# Patient Record
Sex: FEMALE | Race: WHITE | NOT HISPANIC OR LATINO | Employment: OTHER | URBAN - METROPOLITAN AREA
[De-identification: names, ages, dates, MRNs, and addresses within clinical notes are randomized per-mention and may not be internally consistent; named-entity substitution may affect disease eponyms.]

---

## 2018-03-12 ENCOUNTER — OFFICE VISIT (OUTPATIENT)
Dept: FAMILY MEDICINE CLINIC | Facility: CLINIC | Age: 73
End: 2018-03-12

## 2018-03-12 VITALS
DIASTOLIC BLOOD PRESSURE: 87 MMHG | HEART RATE: 66 BPM | OXYGEN SATURATION: 99 % | HEIGHT: 67 IN | BODY MASS INDEX: 27.7 KG/M2 | TEMPERATURE: 98 F | SYSTOLIC BLOOD PRESSURE: 162 MMHG | RESPIRATION RATE: 18 BRPM | WEIGHT: 176.5 LBS

## 2018-03-12 DIAGNOSIS — Z12.31 ENCOUNTER FOR SCREENING MAMMOGRAM FOR BREAST CANCER: ICD-10-CM

## 2018-03-12 DIAGNOSIS — I10 ESSENTIAL HYPERTENSION: Primary | ICD-10-CM

## 2018-03-12 DIAGNOSIS — E78.2 MIXED HYPERLIPIDEMIA: ICD-10-CM

## 2018-03-12 DIAGNOSIS — Z78.0 POSTMENOPAUSAL: ICD-10-CM

## 2018-03-12 DIAGNOSIS — R73.01 IMPAIRED FASTING GLUCOSE: ICD-10-CM

## 2018-03-12 DIAGNOSIS — E55.9 VITAMIN D DEFICIENCY: ICD-10-CM

## 2018-03-12 PROCEDURE — 99204 OFFICE O/P NEW MOD 45 MIN: CPT | Performed by: NURSE PRACTITIONER

## 2018-03-12 RX ORDER — CHLORAL HYDRATE 500 MG
CAPSULE ORAL
COMMUNITY
End: 2020-10-15

## 2018-03-12 RX ORDER — LISINOPRIL 10 MG/1
10 TABLET ORAL DAILY
Qty: 30 TABLET | Refills: 0 | Status: SHIPPED | OUTPATIENT
Start: 2018-03-12 | End: 2018-03-26 | Stop reason: SDUPTHER

## 2018-03-12 RX ORDER — PERPHENAZINE/AMITRIPTYLINE HCL 2 MG-25 MG
TABLET ORAL
COMMUNITY

## 2018-03-12 RX ORDER — ASCORBIC ACID 500 MG
500 TABLET ORAL DAILY
COMMUNITY
End: 2020-10-15

## 2018-03-12 NOTE — PROGRESS NOTES
Subjective   Bethany Weaver is a 72 y.o. female.     History of Present Illness   Bethany Weaver 72 y.o. female who presents today for a new patient appointment.    she has a history of There is no problem list on file for this patient.  .  she is here to establish care and is a new patient to me I reviewed the PFSH recorded today by my MA/LPN staff.   she   She has been feeling well until recent elevated blood pressure and fatigue.    Patient does not take medication for HTN. She states she was started on medication 10 years ago but did not tolerated it as it gave her back pain.  She does not remember the medication. States she has not had issues with her BP since until 2 weeks ago.  She noticed headaches and fatigue.  Her daughter has checked her BP a few times since and noted readings in 160s/90s.  Patient has also noticed increased fatigue and daytime sleepiness. Denies hx of sleep apnea or snoring.  Denies weakness or dizziness, states she just feels tired.     Has not had DEXA scan since 2013.  States her previous PCP wanted to start her on reclast but patient did not want to due to list of side effects.  She thinks she did not tolerate fosamax due to GI issues.  She is hesitant to take medication.  Is due for DEXA.     She has not had mammogram in a few years.  Has not had abnormal.    Denies hx of abnormal PAP and does not want to get anymore.     Her last lab set was last year.  Her fasting glucose was 99 but her A1c was 6.  Will repeat a1c with labs.     The following portions of the patient's history were reviewed and updated as appropriate: allergies, current medications, past family history, past medical history, past social history, past surgical history and problem list.    Review of Systems   Constitutional: Positive for fatigue. Negative for unexpected weight change.   Respiratory: Negative for shortness of breath.    Cardiovascular: Negative for chest pain and palpitations.   Neurological:  Positive for headaches. Negative for dizziness, weakness and light-headedness.   Psychiatric/Behavioral: Negative for behavioral problems.       Objective   Physical Exam   Constitutional: She is oriented to person, place, and time. She appears well-developed and well-nourished.   Neck: Carotid bruit is not present. No thyromegaly present.   Cardiovascular: Normal rate and regular rhythm.    Pulmonary/Chest: Effort normal and breath sounds normal.   Neurological: She is alert and oriented to person, place, and time.   Psychiatric: She has a normal mood and affect. Judgment normal.   Nursing note and vitals reviewed.      Assessment/Plan   Bethany was seen today for hypertension.    Diagnoses and all orders for this visit:    Essential hypertension  -     lisinopril (PRINIVIL,ZESTRIL) 10 MG tablet; Take 1 tablet by mouth Daily.    Vitamin D deficiency  -     Vitamin D 25 Hydroxy    Impaired fasting glucose  -     Hemoglobin A1c    Mixed hyperlipidemia  -     Comprehensive metabolic panel  -     Lipid panel  -     CBC and Differential  -     TSH    Postmenopausal  -     Cancel: DEXA Bone Density Axial  -     DEXA Bone Density Axial    Encounter for screening mammogram for breast cancer  -     Mammo Screening Bilateral With CAD          F/u in 4 weeks for BP recheck.

## 2018-03-13 LAB
25(OH)D3+25(OH)D2 SERPL-MCNC: 34.7 NG/ML (ref 30–100)
ALBUMIN SERPL-MCNC: 4.5 G/DL (ref 3.5–5.2)
ALBUMIN/GLOB SERPL: 1.8 G/DL
ALP SERPL-CCNC: 125 U/L (ref 39–117)
ALT SERPL-CCNC: 12 U/L (ref 1–33)
AST SERPL-CCNC: 16 U/L (ref 1–32)
BASOPHILS # BLD AUTO: 0.03 10*3/MM3 (ref 0–0.2)
BASOPHILS NFR BLD AUTO: 0.4 % (ref 0–1.5)
BILIRUB SERPL-MCNC: 0.3 MG/DL (ref 0.1–1.2)
BUN SERPL-MCNC: 15 MG/DL (ref 8–23)
BUN/CREAT SERPL: 19.2 (ref 7–25)
CALCIUM SERPL-MCNC: 10.3 MG/DL (ref 8.6–10.5)
CHLORIDE SERPL-SCNC: 102 MMOL/L (ref 98–107)
CHOLEST SERPL-MCNC: 244 MG/DL (ref 0–200)
CO2 SERPL-SCNC: 30.9 MMOL/L (ref 22–29)
CREAT SERPL-MCNC: 0.78 MG/DL (ref 0.57–1)
EOSINOPHIL # BLD AUTO: 0.18 10*3/MM3 (ref 0–0.7)
EOSINOPHIL NFR BLD AUTO: 2.4 % (ref 0.3–6.2)
ERYTHROCYTE [DISTWIDTH] IN BLOOD BY AUTOMATED COUNT: 13.4 % (ref 11.7–13)
GFR SERPLBLD CREATININE-BSD FMLA CKD-EPI: 73 ML/MIN/1.73
GFR SERPLBLD CREATININE-BSD FMLA CKD-EPI: 88 ML/MIN/1.73
GLOBULIN SER CALC-MCNC: 2.5 GM/DL
GLUCOSE SERPL-MCNC: 97 MG/DL (ref 65–99)
HBA1C MFR BLD: 5.5 % (ref 4.8–5.6)
HCT VFR BLD AUTO: 44.5 % (ref 35.6–45.5)
HDLC SERPL-MCNC: 98 MG/DL (ref 40–60)
HGB BLD-MCNC: 13.8 G/DL (ref 11.9–15.5)
IMM GRANULOCYTES # BLD: 0 10*3/MM3 (ref 0–0.03)
IMM GRANULOCYTES NFR BLD: 0 % (ref 0–0.5)
LDLC SERPL CALC-MCNC: 127 MG/DL (ref 0–100)
LYMPHOCYTES # BLD AUTO: 2.76 10*3/MM3 (ref 0.9–4.8)
LYMPHOCYTES NFR BLD AUTO: 37.1 % (ref 19.6–45.3)
MCH RBC QN AUTO: 29.6 PG (ref 26.9–32)
MCHC RBC AUTO-ENTMCNC: 31 G/DL (ref 32.4–36.3)
MCV RBC AUTO: 95.5 FL (ref 80.5–98.2)
MONOCYTES # BLD AUTO: 0.48 10*3/MM3 (ref 0.2–1.2)
MONOCYTES NFR BLD AUTO: 6.5 % (ref 5–12)
NEUTROPHILS # BLD AUTO: 3.98 10*3/MM3 (ref 1.9–8.1)
NEUTROPHILS NFR BLD AUTO: 53.6 % (ref 42.7–76)
PLATELET # BLD AUTO: 284 10*3/MM3 (ref 140–500)
POTASSIUM SERPL-SCNC: 4.9 MMOL/L (ref 3.5–5.2)
PROT SERPL-MCNC: 7 G/DL (ref 6–8.5)
RBC # BLD AUTO: 4.66 10*6/MM3 (ref 3.9–5.2)
SODIUM SERPL-SCNC: 142 MMOL/L (ref 136–145)
TRIGL SERPL-MCNC: 96 MG/DL (ref 0–150)
TSH SERPL DL<=0.005 MIU/L-ACNC: 4.02 MIU/ML (ref 0.27–4.2)
VLDLC SERPL CALC-MCNC: 19.2 MG/DL (ref 5–40)
WBC # BLD AUTO: 7.43 10*3/MM3 (ref 4.5–10.7)

## 2018-03-26 ENCOUNTER — OFFICE VISIT (OUTPATIENT)
Dept: FAMILY MEDICINE CLINIC | Facility: CLINIC | Age: 73
End: 2018-03-26

## 2018-03-26 VITALS
HEIGHT: 67 IN | HEART RATE: 68 BPM | TEMPERATURE: 97.8 F | SYSTOLIC BLOOD PRESSURE: 148 MMHG | RESPIRATION RATE: 18 BRPM | OXYGEN SATURATION: 96 % | WEIGHT: 176 LBS | BODY MASS INDEX: 27.62 KG/M2 | DIASTOLIC BLOOD PRESSURE: 73 MMHG

## 2018-03-26 DIAGNOSIS — M19.90 ARTHRITIS: ICD-10-CM

## 2018-03-26 DIAGNOSIS — I10 ESSENTIAL HYPERTENSION: Primary | ICD-10-CM

## 2018-03-26 PROCEDURE — 99213 OFFICE O/P EST LOW 20 MIN: CPT | Performed by: NURSE PRACTITIONER

## 2018-03-26 RX ORDER — LISINOPRIL 20 MG/1
20 TABLET ORAL DAILY
Qty: 30 TABLET | Refills: 0 | Status: SHIPPED | OUTPATIENT
Start: 2018-03-26 | End: 2018-04-19 | Stop reason: SDUPTHER

## 2018-03-26 NOTE — PROGRESS NOTES
Subjective   Bethany Weaver is a 72 y.o. female.     History of Present Illness   Patient presents to office to f/u on blood pressure. At last visit she was started on lisinopril 10 mg. Patient states she has been taking as prescribed. She reports feeling well and denies side effects.     Patient has arthritis in her right shoulder and both knees. She has had this for years.  She has a friend that does aquatic therapy at [facility] and patient would like an order so she can try this for treatment.   The following portions of the patient's history were reviewed and updated as appropriate: allergies, current medications, past family history, past medical history, past social history, past surgical history and problem list.    Review of Systems   Constitutional: Negative for unexpected weight change.   Respiratory: Negative for shortness of breath.    Cardiovascular: Negative for chest pain and palpitations.   Psychiatric/Behavioral: Negative for behavioral problems.       Objective   Physical Exam   Constitutional: She is oriented to person, place, and time. She appears well-developed and well-nourished.   Pulmonary/Chest: Effort normal.   Neurological: She is alert and oriented to person, place, and time.   Psychiatric: She has a normal mood and affect. Judgment normal.   Nursing note and vitals reviewed.      Assessment/Plan   Bethany was seen today for hypertension.    Diagnoses and all orders for this visit:    Essential hypertension  -     lisinopril (PRINIVIL,ZESTRIL) 20 MG tablet; Take 1 tablet by mouth Daily.    Arthritis  -     Cancel: [employer]  -     [employer]

## 2018-04-18 ENCOUNTER — HOSPITAL ENCOUNTER (OUTPATIENT)
Dept: MAMMOGRAPHY | Facility: HOSPITAL | Age: 73
Discharge: HOME | End: 2018-04-18

## 2018-04-18 ENCOUNTER — HOSPITAL ENCOUNTER (OUTPATIENT)
Dept: BONE DENSITY | Facility: HOSPITAL | Age: 73
Discharge: HOME | End: 2018-04-18
Admitting: NURSE PRACTITIONER

## 2018-04-18 PROCEDURE — 77067 SCR MAMMO BI INCL CAD: CPT

## 2018-04-18 PROCEDURE — 77080 DXA BONE DENSITY AXIAL: CPT

## 2018-04-19 ENCOUNTER — OFFICE VISIT (OUTPATIENT)
Dept: FAMILY MEDICINE CLINIC | Facility: CLINIC | Age: 73
End: 2018-04-19

## 2018-04-19 VITALS
DIASTOLIC BLOOD PRESSURE: 84 MMHG | SYSTOLIC BLOOD PRESSURE: 122 MMHG | WEIGHT: 175 LBS | RESPIRATION RATE: 18 BRPM | HEIGHT: 67 IN | OXYGEN SATURATION: 98 % | BODY MASS INDEX: 27.47 KG/M2 | TEMPERATURE: 98 F | HEART RATE: 68 BPM

## 2018-04-19 DIAGNOSIS — F41.9 ANXIETY: Primary | ICD-10-CM

## 2018-04-19 DIAGNOSIS — I10 ESSENTIAL HYPERTENSION: ICD-10-CM

## 2018-04-19 DIAGNOSIS — M85.89 OSTEOPENIA OF MULTIPLE SITES: ICD-10-CM

## 2018-04-19 PROCEDURE — 99214 OFFICE O/P EST MOD 30 MIN: CPT | Performed by: NURSE PRACTITIONER

## 2018-04-19 RX ORDER — ESCITALOPRAM OXALATE 10 MG/1
10 TABLET ORAL DAILY
Qty: 30 TABLET | Refills: 0 | Status: SHIPPED | OUTPATIENT
Start: 2018-04-19 | End: 2018-05-16 | Stop reason: SDUPTHER

## 2018-04-19 RX ORDER — LISINOPRIL 20 MG/1
20 TABLET ORAL DAILY
Qty: 30 TABLET | Refills: 5 | Status: SHIPPED | OUTPATIENT
Start: 2018-04-19 | End: 2018-05-16 | Stop reason: SDUPTHER

## 2018-04-19 NOTE — PROGRESS NOTES
Subjective   Bethany Weaver is a 72 y.o. female.     History of Present Illness   Patient presents to office to f/u on blood pressure. At last visit, her lisinopril was increased to 20 mg daily. Patient states she has been taking as prescribed. She reports feeling well and denies side effects.     Patient reports having a lot of anxiety related to taking care of family.  She would like to try something for anxiety.     Discussed negative mammogram and abnormal DEXA results.  Patient is taking caclium supplement and vitamin D but is not sure how much, nor how much she should be taking.   The following portions of the patient's history were reviewed and updated as appropriate: allergies, current medications, past family history, past medical history, past social history, past surgical history and problem list.    Review of Systems   Constitutional: Negative for unexpected weight change.   Respiratory: Negative for shortness of breath.    Cardiovascular: Negative for chest pain and palpitations.   Psychiatric/Behavioral: Negative for behavioral problems, self-injury, sleep disturbance and suicidal ideas. The patient is nervous/anxious.        Objective   Physical Exam   Constitutional: She is oriented to person, place, and time. She appears well-developed and well-nourished.   Cardiovascular: Normal rate and regular rhythm.    Pulmonary/Chest: Effort normal and breath sounds normal.   Neurological: She is alert and oriented to person, place, and time.   Psychiatric: Her speech is normal and behavior is normal. Judgment and thought content normal. Her mood appears anxious.   tearful   Nursing note and vitals reviewed.      Assessment/Plan   Bethany was seen today for hypertension.    Diagnoses and all orders for this visit:    Anxiety  -     escitalopram (LEXAPRO) 10 MG tablet; Take 1 tablet by mouth Daily.    Essential hypertension  -     lisinopril (PRINIVIL,ZESTRIL) 20 MG tablet; Take 1 tablet by mouth  Daily.    Osteopenia of multiple sites  -     Calcium 600-200 MG-UNIT per tablet; Take 2 tablets by mouth Daily.

## 2018-05-16 ENCOUNTER — OFFICE VISIT (OUTPATIENT)
Dept: FAMILY MEDICINE CLINIC | Facility: CLINIC | Age: 73
End: 2018-05-16

## 2018-05-16 VITALS
RESPIRATION RATE: 16 BRPM | DIASTOLIC BLOOD PRESSURE: 70 MMHG | HEART RATE: 62 BPM | SYSTOLIC BLOOD PRESSURE: 132 MMHG | HEIGHT: 67 IN | TEMPERATURE: 97.7 F | BODY MASS INDEX: 27.47 KG/M2 | OXYGEN SATURATION: 98 % | WEIGHT: 175 LBS

## 2018-05-16 DIAGNOSIS — I10 ESSENTIAL HYPERTENSION: ICD-10-CM

## 2018-05-16 DIAGNOSIS — F41.9 ANXIETY: ICD-10-CM

## 2018-05-16 PROCEDURE — 99213 OFFICE O/P EST LOW 20 MIN: CPT | Performed by: NURSE PRACTITIONER

## 2018-05-16 RX ORDER — ESCITALOPRAM OXALATE 10 MG/1
10 TABLET ORAL DAILY
Qty: 30 TABLET | Refills: 5 | Status: SHIPPED | OUTPATIENT
Start: 2018-05-16 | End: 2018-11-19 | Stop reason: SDUPTHER

## 2018-05-16 RX ORDER — LISINOPRIL 20 MG/1
20 TABLET ORAL DAILY
Qty: 30 TABLET | Refills: 5 | Status: SHIPPED | OUTPATIENT
Start: 2018-05-16 | End: 2018-11-19 | Stop reason: SDUPTHER

## 2018-05-16 NOTE — PROGRESS NOTES
Subjective   Bethany Weaver is a 72 y.o. female.     History of Present Illness   Bethany Weaver 72 y.o. female who presents today for routine follow up check and medication refills.  she has a history of   Patient Active Problem List   Diagnosis   • Osteopenia of multiple sites   .  Since the last visit, she has overall felt well.  She has Hypertenision and is well controlled on medication and anxiety is well controlled on lexapro.  she has been compliant with current medications have reviewed them.  The patient denies medication side effects.    Results for orders placed or performed in visit on 03/12/18   Comprehensive metabolic panel   Result Value Ref Range    Glucose 97 65 - 99 mg/dL    BUN 15 8 - 23 mg/dL    Creatinine 0.78 0.57 - 1.00 mg/dL    eGFR Non African Am 73 >60 mL/min/1.73    eGFR African Am 88 >60 mL/min/1.73    BUN/Creatinine Ratio 19.2 7.0 - 25.0    Sodium 142 136 - 145 mmol/L    Potassium 4.9 3.5 - 5.2 mmol/L    Chloride 102 98 - 107 mmol/L    Total CO2 30.9 (H) 22.0 - 29.0 mmol/L    Calcium 10.3 8.6 - 10.5 mg/dL    Total Protein 7.0 6.0 - 8.5 g/dL    Albumin 4.50 3.50 - 5.20 g/dL    Globulin 2.5 gm/dL    A/G Ratio 1.8 g/dL    Total Bilirubin 0.3 0.1 - 1.2 mg/dL    Alkaline Phosphatase 125 (H) 39 - 117 U/L    AST (SGOT) 16 1 - 32 U/L    ALT (SGPT) 12 1 - 33 U/L   Lipid panel   Result Value Ref Range    Total Cholesterol 244 (H) 0 - 200 mg/dL    Triglycerides 96 0 - 150 mg/dL    HDL Cholesterol 98 (H) 40 - 60 mg/dL    VLDL Cholesterol 19.2 5 - 40 mg/dL    LDL Cholesterol  127 (H) 0 - 100 mg/dL   TSH   Result Value Ref Range    TSH 4.020 0.270 - 4.200 mIU/mL   Hemoglobin A1c   Result Value Ref Range    Hemoglobin A1C 5.50 4.80 - 5.60 %   Vitamin D 25 Hydroxy   Result Value Ref Range    25 Hydroxy, Vitamin D 34.7 30.0 - 100.0 ng/ml   CBC and Differential   Result Value Ref Range    WBC 7.43 4.50 - 10.70 10*3/mm3    RBC 4.66 3.90 - 5.20 10*6/mm3    Hemoglobin 13.8 11.9 - 15.5 g/dL     Hematocrit 44.5 35.6 - 45.5 %    MCV 95.5 80.5 - 98.2 fL    MCH 29.6 26.9 - 32.0 pg    MCHC 31.0 (L) 32.4 - 36.3 g/dL    RDW 13.4 (H) 11.7 - 13.0 %    Platelets 284 140 - 500 10*3/mm3    Neutrophil Rel % 53.6 42.7 - 76.0 %    Lymphocyte Rel % 37.1 19.6 - 45.3 %    Monocyte Rel % 6.5 5.0 - 12.0 %    Eosinophil Rel % 2.4 0.3 - 6.2 %    Basophil Rel % 0.4 0.0 - 1.5 %    Neutrophils Absolute 3.98 1.90 - 8.10 10*3/mm3    Lymphocytes Absolute 2.76 0.90 - 4.80 10*3/mm3    Monocytes Absolute 0.48 0.20 - 1.20 10*3/mm3    Eosinophils Absolute 0.18 0.00 - 0.70 10*3/mm3    Basophils Absolute 0.03 0.00 - 0.20 10*3/mm3    Immature Granulocyte Rel % 0.0 0.0 - 0.5 %    Immature Grans Absolute 0.00 0.00 - 0.03 10*3/mm3       The following portions of the patient's history were reviewed and updated as appropriate: allergies, current medications, past family history, past medical history, past social history, past surgical history and problem list.    Review of Systems   Constitutional: Negative for unexpected weight change.   Respiratory: Negative for shortness of breath.    Cardiovascular: Negative for chest pain and palpitations.   Psychiatric/Behavioral: Negative for behavioral problems, dysphoric mood, self-injury, sleep disturbance and suicidal ideas. The patient is not nervous/anxious.        Objective   Physical Exam   Constitutional: She is oriented to person, place, and time. She appears well-developed and well-nourished.   Cardiovascular: Normal rate and regular rhythm.    Pulmonary/Chest: Effort normal and breath sounds normal.   Neurological: She is alert and oriented to person, place, and time.   Psychiatric: She has a normal mood and affect. Her behavior is normal. Judgment and thought content normal.   Nursing note and vitals reviewed.      Assessment/Plan   Bethany was seen today for anxiety and hypertension.    Diagnoses and all orders for this visit:    Anxiety  -     escitalopram (LEXAPRO) 10 MG tablet; Take 1  tablet by mouth Daily.    Essential hypertension  -     lisinopril (PRINIVIL,ZESTRIL) 20 MG tablet; Take 1 tablet by mouth Daily.

## 2018-11-19 ENCOUNTER — OFFICE VISIT (OUTPATIENT)
Dept: FAMILY MEDICINE CLINIC | Facility: CLINIC | Age: 73
End: 2018-11-19

## 2018-11-19 VITALS
TEMPERATURE: 97.7 F | WEIGHT: 177 LBS | HEIGHT: 67 IN | OXYGEN SATURATION: 94 % | SYSTOLIC BLOOD PRESSURE: 132 MMHG | DIASTOLIC BLOOD PRESSURE: 76 MMHG | BODY MASS INDEX: 27.78 KG/M2 | HEART RATE: 66 BPM

## 2018-11-19 DIAGNOSIS — I10 ESSENTIAL HYPERTENSION: ICD-10-CM

## 2018-11-19 DIAGNOSIS — F41.9 ANXIETY: ICD-10-CM

## 2018-11-19 DIAGNOSIS — M85.89 OSTEOPENIA OF MULTIPLE SITES: ICD-10-CM

## 2018-11-19 PROCEDURE — 96160 PT-FOCUSED HLTH RISK ASSMT: CPT | Performed by: NURSE PRACTITIONER

## 2018-11-19 PROCEDURE — 99213 OFFICE O/P EST LOW 20 MIN: CPT | Performed by: NURSE PRACTITIONER

## 2018-11-19 PROCEDURE — G0439 PPPS, SUBSEQ VISIT: HCPCS | Performed by: NURSE PRACTITIONER

## 2018-11-19 RX ORDER — LISINOPRIL 20 MG/1
20 TABLET ORAL DAILY
Qty: 30 TABLET | Refills: 5 | Status: SHIPPED | OUTPATIENT
Start: 2018-11-19 | End: 2019-11-21 | Stop reason: SDUPTHER

## 2018-11-19 RX ORDER — ESCITALOPRAM OXALATE 10 MG/1
10 TABLET ORAL DAILY
Qty: 30 TABLET | Refills: 5 | Status: SHIPPED | OUTPATIENT
Start: 2018-11-19 | End: 2020-06-03 | Stop reason: SDUPTHER

## 2018-11-19 NOTE — PATIENT INSTRUCTIONS
Medicare Wellness  Personal Prevention Plan of Service     Date of Office Visit:  2018  Encounter Provider:  SONNY Damico  Place of Service:  [facility]  Patient Name: Bethany Weaver  :  1945    As part of the Medicare Wellness portion of your visit today, we are providing you with this personalized preventive plan of services (PPPS). This plan is based upon recommendations of the [place] Preventive Services Task Force (USPSTF) and the Advisory Committee on Immunization Practices (ACIP).    This lists the preventive care services that should be considered, and provides dates of when you are due. Items listed as completed are up-to-date and do not require any further intervention.    Health Maintenance   Topic Date Due   • ZOSTER VACCINE (2 of 2) 2016   • MEDICARE ANNUAL WELLNESS  2018 (Originally 3/12/2018)   • PNEUMOCOCCAL VACCINES (65+ LOW/MEDIUM RISK) (1 of 2 - PCV13) 2018 (Originally 2012)   • TDAP/TD VACCINES (1 - Tdap) 2018 (Originally 1966)   • LIPID PANEL  2019   • MAMMOGRAM  2020   • COLONOSCOPY  2026   • INFLUENZA VACCINE  Completed   • HEPATITIS C SCREENING  Discontinued       No orders of the defined types were placed in this encounter.      No Follow-up on file.

## 2018-11-19 NOTE — PROGRESS NOTES
Subjective   Bethany Weaver is a 72 y.o. female.     History of Present Illness   Bethany Weaver 72 y.o. female who presents today for routine follow up check and medication refills.  she has a history of   Patient Active Problem List   Diagnosis   • Osteopenia of multiple sites   .  Since the last visit, she has overall felt well.  She has Hypertenision and is well controlled on medication and anxiety which is well controlled on current regimen.  she has been compliant with current medications have reviewed them.  The patient denies medication side effects.    Results for orders placed or performed in visit on 03/12/18   Comprehensive metabolic panel   Result Value Ref Range    Glucose 97 65 - 99 mg/dL    BUN 15 8 - 23 mg/dL    Creatinine 0.78 0.57 - 1.00 mg/dL    eGFR Non African Am 73 >60 mL/min/1.73    eGFR African Am 88 >60 mL/min/1.73    BUN/Creatinine Ratio 19.2 7.0 - 25.0    Sodium 142 136 - 145 mmol/L    Potassium 4.9 3.5 - 5.2 mmol/L    Chloride 102 98 - 107 mmol/L    Total CO2 30.9 (H) 22.0 - 29.0 mmol/L    Calcium 10.3 8.6 - 10.5 mg/dL    Total Protein 7.0 6.0 - 8.5 g/dL    Albumin 4.50 3.50 - 5.20 g/dL    Globulin 2.5 gm/dL    A/G Ratio 1.8 g/dL    Total Bilirubin 0.3 0.1 - 1.2 mg/dL    Alkaline Phosphatase 125 (H) 39 - 117 U/L    AST (SGOT) 16 1 - 32 U/L    ALT (SGPT) 12 1 - 33 U/L   Lipid panel   Result Value Ref Range    Total Cholesterol 244 (H) 0 - 200 mg/dL    Triglycerides 96 0 - 150 mg/dL    HDL Cholesterol 98 (H) 40 - 60 mg/dL    VLDL Cholesterol 19.2 5 - 40 mg/dL    LDL Cholesterol  127 (H) 0 - 100 mg/dL   TSH   Result Value Ref Range    TSH 4.020 0.270 - 4.200 mIU/mL   Hemoglobin A1c   Result Value Ref Range    Hemoglobin A1C 5.50 4.80 - 5.60 %   Vitamin D 25 Hydroxy   Result Value Ref Range    25 Hydroxy, Vitamin D 34.7 30.0 - 100.0 ng/ml   CBC and Differential   Result Value Ref Range    WBC 7.43 4.50 - 10.70 10*3/mm3    RBC 4.66 3.90 - 5.20 10*6/mm3    Hemoglobin 13.8 11.9 - 15.5  g/dL    Hematocrit 44.5 35.6 - 45.5 %    MCV 95.5 80.5 - 98.2 fL    MCH 29.6 26.9 - 32.0 pg    MCHC 31.0 (L) 32.4 - 36.3 g/dL    RDW 13.4 (H) 11.7 - 13.0 %    Platelets 284 140 - 500 10*3/mm3    Neutrophil Rel % 53.6 42.7 - 76.0 %    Lymphocyte Rel % 37.1 19.6 - 45.3 %    Monocyte Rel % 6.5 5.0 - 12.0 %    Eosinophil Rel % 2.4 0.3 - 6.2 %    Basophil Rel % 0.4 0.0 - 1.5 %    Neutrophils Absolute 3.98 1.90 - 8.10 10*3/mm3    Lymphocytes Absolute 2.76 0.90 - 4.80 10*3/mm3    Monocytes Absolute 0.48 0.20 - 1.20 10*3/mm3    Eosinophils Absolute 0.18 0.00 - 0.70 10*3/mm3    Basophils Absolute 0.03 0.00 - 0.20 10*3/mm3    Immature Granulocyte Rel % 0.0 0.0 - 0.5 %    Immature Grans Absolute 0.00 0.00 - 0.03 10*3/mm3       The following portions of the patient's history were reviewed and updated as appropriate: allergies, current medications, past family history, past medical history, past social history, past surgical history and problem list.    Review of Systems   Constitutional: Negative for unexpected weight change.   Respiratory: Negative for shortness of breath.    Cardiovascular: Negative for chest pain and palpitations.   Endocrine: Negative for cold intolerance, heat intolerance, polydipsia, polyphagia and polyuria.   Psychiatric/Behavioral: Negative for behavioral problems, dysphoric mood, self-injury, sleep disturbance and suicidal ideas. The patient is not nervous/anxious.        Objective   Physical Exam   Constitutional: She is oriented to person, place, and time. She appears well-developed and well-nourished.   Neck: Carotid bruit is not present.   Cardiovascular: Normal rate and regular rhythm.   Pulmonary/Chest: Effort normal and breath sounds normal.   Neurological: She is alert and oriented to person, place, and time.   Psychiatric: She has a normal mood and affect. Judgment normal.   Nursing note and vitals reviewed.      Assessment/Plan   Bethany was seen today for hypertension.    Diagnoses and all  orders for this visit:    Essential hypertension  -     lisinopril (PRINIVIL,ZESTRIL) 20 MG tablet; Take 1 tablet by mouth Daily.    Anxiety  -     escitalopram (LEXAPRO) 10 MG tablet; Take 1 tablet by mouth Daily.    Osteopenia of multiple sites    Other orders  -     Cancel: Calcium 600-200 MG-UNIT per tablet; Take 2 tablets by mouth Daily.

## 2018-11-19 NOTE — PROGRESS NOTES
QUICK REFERENCE INFORMATION:  The ABCs of the Annual Wellness Visit    Subsequent Medicare Wellness Visit    HEALTH RISK ASSESSMENT    1945    Recent Hospitalizations:  No hospitalization(s) within the last year..        Current Medical Providers:  Patient Care Team:  Shameka Rodriguez APRN as PCP - General (Family Medicine)        Smoking Status:  Social History     Tobacco Use   Smoking Status Never Smoker   Smokeless Tobacco Never Used       Alcohol Consumption:  Social History     Substance and Sexual Activity   Alcohol Use No       Depression Screen:   PHQ-2/PHQ-9 Depression Screening 11/19/2018   Little interest or pleasure in doing things 0   Feeling down, depressed, or hopeless 0   Total Score 0       Health Habits and Functional and Cognitive Screening:  Functional & Cognitive Status 11/19/2018   Do you have difficulty preparing food and eating? No   Do you have difficulty bathing yourself, getting dressed or grooming yourself? No   Do you have difficulty using the toilet? No   Do you have difficulty moving around from place to place? No   Do you have trouble with steps or getting out of a bed or a chair? No   In the past year have you fallen or experienced a near fall? No   Current Diet Well Balanced Diet   Dental Exam Up to date   Eye Exam Up to date   Exercise (times per week) 0 times per week   Current Exercise Activities Include Cardiovasular Workout on Exercise Equipment   Do you need help using the phone?  No   Are you deaf or do you have serious difficulty hearing?  No   Do you need help with transportation? No   Do you need help shopping? No   Do you need help preparing meals?  No   Do you need help with housework?  No   Do you need help with laundry? No   Do you need help taking your medications? No   Do you need help managing money? No   Do you ever drive or ride in a car without wearing a seat belt? No   Have you felt unusual stress, anger or loneliness in the last month? No   Who  do you live with? Child   If you need help, do you have trouble finding someone available to you? Yes   Have you been bothered in the last four weeks by sexual problems? No   Do you have difficulty concentrating, remembering or making decisions? No           Does the patient have evidence of cognitive impairment? No    Aspirin use counseling: Start ASA 81 mg daily       Recent Lab Results:  CMP:  Lab Results   Component Value Date    GLU 97 03/12/2018    BUN 15 03/12/2018    CREATININE 0.78 03/12/2018    EGFRIFNONA 73 03/12/2018    EGFRIFAFRI 88 03/12/2018    BCR 19.2 03/12/2018     03/12/2018    K 4.9 03/12/2018    CO2 30.9 (H) 03/12/2018    CALCIUM 10.3 03/12/2018    PROTENTOTREF 7.0 03/12/2018    ALBUMIN 4.50 03/12/2018    LABGLOBREF 2.5 03/12/2018    LABIL2 1.8 03/12/2018    BILITOT 0.3 03/12/2018    ALKPHOS 125 (H) 03/12/2018    AST 16 03/12/2018    ALT 12 03/12/2018     Lipid Panel:  Lab Results   Component Value Date    TRIG 96 03/12/2018    HDL 98 (H) 03/12/2018    VLDL 19.2 03/12/2018     HbA1c:  Lab Results   Component Value Date    HGBA1C 5.50 03/12/2018       Visual Acuity:  No exam data present    Age-appropriate Screening Schedule:  Refer to the list below for future screening recommendations based on patient's age, sex and/or medical conditions. Orders for these recommended tests are listed in the plan section. The patient has been provided with a written plan.    Health Maintenance   Topic Date Due   • ZOSTER VACCINE (2 of 2) 02/26/2016   • PNEUMOCOCCAL VACCINES (65+ LOW/MEDIUM RISK) (1 of 2 - PCV13) 12/31/2018 (Originally 12/21/2012)   • TDAP/TD VACCINES (1 - Tdap) 12/31/2018 (Originally 12/21/1966)   • LIPID PANEL  03/12/2019   • MAMMOGRAM  04/18/2020   • COLONOSCOPY  02/23/2026   • INFLUENZA VACCINE  Completed        Subjective   History of Present Illness    Bethany Weaver is a 72 y.o. female who presents for an Subsequent Wellness Visit.    The following portions of the patient's  "history were reviewed and updated as appropriate: allergies, current medications, past family history, past medical history, past social history, past surgical history and problem list.    Outpatient Medications Prior to Visit   Medication Sig Dispense Refill   • BIOTIN 5000 PO Take  by mouth.     • Calcium 600-200 MG-UNIT per tablet Take 2 tablets by mouth Daily. 60 tablet 11   • Cholecalciferol (VITAMIN D3) 5000 units capsule capsule Take 1,000 Units by mouth Daily.     • Cyanocobalamin (B-12) 3000 MCG sublingual tablet Place  under the tongue.     • Loratadine (CLARITIN PO) Take  by mouth.     • Misc Natural Products (OSTEO BI-FLEX JOINT SHIELD PO) Take  by mouth.     • Omega-3 Fatty Acids (FISH OIL) 1000 MG capsule capsule Take  by mouth Daily With Breakfast.     • vitamin C (ASCORBIC ACID) 500 MG tablet Take 500 mg by mouth Daily.     • escitalopram (LEXAPRO) 10 MG tablet Take 1 tablet by mouth Daily. 30 tablet 5   • lisinopril (PRINIVIL,ZESTRIL) 20 MG tablet Take 1 tablet by mouth Daily. 30 tablet 5     No facility-administered medications prior to visit.        Patient Active Problem List   Diagnosis   • Osteopenia of multiple sites       Advance Care Planning:  has an advance directive - a copy HAS NOT been provided    Identification of Risk Factors:  Risk factors include: weight  and cardiovascular risk.    Review of Systems    Compared to one year ago, the patient feels her physical health is worse.  Reports she feels more fatigued than last year.    Compared to one year ago, the patient feels her mental health is the same.    Objective     Physical Exam    Vitals:    11/19/18 1027 11/19/18 1112   BP: 144/76 132/76   BP Location: Left arm    Patient Position: Sitting    Cuff Size: Adult    Pulse: 66    Temp: 97.7 °F (36.5 °C)    TempSrc: Oral    SpO2: 94%    Weight: 80.3 kg (177 lb)    Height: 170.2 cm (67\")        Patient's Body mass index is 27.72 kg/m². BMI is above normal parameters. Recommendations " include: exercise counseling.      Assessment/Plan   Patient Self-Management and Personalized Health Advice  The patient has been provided with information about: diet and exercise and preventive services including:   · Exercise counseling provided.    Visit Diagnoses:    ICD-10-CM ICD-9-CM   1. Essential hypertension I10 401.9   2. Anxiety F41.9 300.00   3. Osteopenia of multiple sites M85.89 733.90       No orders of the defined types were placed in this encounter.      Outpatient Encounter Medications as of 11/19/2018   Medication Sig Dispense Refill   • BIOTIN 5000 PO Take  by mouth.     • Calcium 600-200 MG-UNIT per tablet Take 2 tablets by mouth Daily. 60 tablet 11   • Cholecalciferol (VITAMIN D3) 5000 units capsule capsule Take 1,000 Units by mouth Daily.     • Cyanocobalamin (B-12) 3000 MCG sublingual tablet Place  under the tongue.     • escitalopram (LEXAPRO) 10 MG tablet Take 1 tablet by mouth Daily. 30 tablet 5   • lisinopril (PRINIVIL,ZESTRIL) 20 MG tablet Take 1 tablet by mouth Daily. 30 tablet 5   • Loratadine (CLARITIN PO) Take  by mouth.     • Misc Natural Products (OSTEO BI-FLEX JOINT SHIELD PO) Take  by mouth.     • Omega-3 Fatty Acids (FISH OIL) 1000 MG capsule capsule Take  by mouth Daily With Breakfast.     • vitamin C (ASCORBIC ACID) 500 MG tablet Take 500 mg by mouth Daily.     • [DISCONTINUED] escitalopram (LEXAPRO) 10 MG tablet Take 1 tablet by mouth Daily. 30 tablet 5   • [DISCONTINUED] lisinopril (PRINIVIL,ZESTRIL) 20 MG tablet Take 1 tablet by mouth Daily. 30 tablet 5     No facility-administered encounter medications on file as of 11/19/2018.        Reviewed use of high risk medication in the elderly: yes  Reviewed for potential of harmful drug interactions in the elderly: yes    Follow Up:  No Follow-up on file.     An After Visit Summary and PPPS with all of these plans were given to the patient.

## 2019-11-21 DIAGNOSIS — I10 ESSENTIAL HYPERTENSION: ICD-10-CM

## 2019-11-21 RX ORDER — LISINOPRIL 20 MG/1
20 TABLET ORAL DAILY
Qty: 30 TABLET | Refills: 0 | Status: SHIPPED | OUTPATIENT
Start: 2019-11-21 | End: 2020-01-24

## 2020-01-18 DIAGNOSIS — I10 ESSENTIAL HYPERTENSION: ICD-10-CM

## 2020-01-24 RX ORDER — LISINOPRIL 20 MG/1
TABLET ORAL
Qty: 30 TABLET | Refills: 0 | Status: SHIPPED | OUTPATIENT
Start: 2020-01-24 | End: 2020-03-18

## 2020-02-27 DIAGNOSIS — I10 ESSENTIAL HYPERTENSION: ICD-10-CM

## 2020-02-27 RX ORDER — LISINOPRIL 20 MG/1
TABLET ORAL
Qty: 30 TABLET | Refills: 0 | OUTPATIENT
Start: 2020-02-27

## 2020-03-18 DIAGNOSIS — I10 ESSENTIAL HYPERTENSION: ICD-10-CM

## 2020-03-18 RX ORDER — LISINOPRIL 20 MG/1
TABLET ORAL
Qty: 30 TABLET | Refills: 0 | Status: SHIPPED | OUTPATIENT
Start: 2020-03-18 | End: 2020-06-03

## 2020-04-23 DIAGNOSIS — I10 ESSENTIAL HYPERTENSION: ICD-10-CM

## 2020-04-23 RX ORDER — LISINOPRIL 20 MG/1
TABLET ORAL
Qty: 30 TABLET | Refills: 0 | OUTPATIENT
Start: 2020-04-23

## 2020-05-18 DIAGNOSIS — I10 ESSENTIAL HYPERTENSION: ICD-10-CM

## 2020-05-18 RX ORDER — LISINOPRIL 20 MG/1
TABLET ORAL
Qty: 30 TABLET | Refills: 0 | OUTPATIENT
Start: 2020-05-18

## 2020-06-03 ENCOUNTER — OFFICE VISIT (OUTPATIENT)
Dept: FAMILY MEDICINE CLINIC | Facility: CLINIC | Age: 75
End: 2020-06-03

## 2020-06-03 VITALS
WEIGHT: 175 LBS | HEIGHT: 67 IN | HEART RATE: 64 BPM | BODY MASS INDEX: 27.47 KG/M2 | SYSTOLIC BLOOD PRESSURE: 130 MMHG | TEMPERATURE: 97.6 F | RESPIRATION RATE: 16 BRPM | DIASTOLIC BLOOD PRESSURE: 70 MMHG | OXYGEN SATURATION: 96 %

## 2020-06-03 DIAGNOSIS — F41.9 ANXIETY: ICD-10-CM

## 2020-06-03 DIAGNOSIS — I10 ESSENTIAL HYPERTENSION: Primary | ICD-10-CM

## 2020-06-03 PROCEDURE — 99214 OFFICE O/P EST MOD 30 MIN: CPT | Performed by: NURSE PRACTITIONER

## 2020-06-03 RX ORDER — ESCITALOPRAM OXALATE 10 MG/1
10 TABLET ORAL DAILY
Qty: 30 TABLET | Refills: 5 | Status: SHIPPED | OUTPATIENT
Start: 2020-06-03 | End: 2021-06-04

## 2020-06-03 NOTE — PROGRESS NOTES
"Subjective   Bethany Weaver is a 74 y.o. female.     History of Present Illness    Since the last visit, she has overall felt well.  She has HTN and has been out of medication for several months.  Her BP is doing well off medication and she states she has similar readings at home.  She has also been out of her lexparo which she felt controlled her anxiety and would like to restart.  she has been compliant with current medications have reviewed them.  The patient denies medication side effects.  Will refill medications. /70   Pulse 64   Temp 97.6 °F (36.4 °C)   Resp 16   Ht 170.2 cm (67\")   Wt 79.4 kg (175 lb)   SpO2 96%   BMI 27.41 kg/m²     Results for orders placed or performed in visit on 03/12/18   Comprehensive metabolic panel   Result Value Ref Range    Glucose 97 65 - 99 mg/dL    BUN 15 8 - 23 mg/dL    Creatinine 0.78 0.57 - 1.00 mg/dL    eGFR Non African Am 73 >60 mL/min/1.73    eGFR African Am 88 >60 mL/min/1.73    BUN/Creatinine Ratio 19.2 7.0 - 25.0    Sodium 142 136 - 145 mmol/L    Potassium 4.9 3.5 - 5.2 mmol/L    Chloride 102 98 - 107 mmol/L    Total CO2 30.9 (H) 22.0 - 29.0 mmol/L    Calcium 10.3 8.6 - 10.5 mg/dL    Total Protein 7.0 6.0 - 8.5 g/dL    Albumin 4.50 3.50 - 5.20 g/dL    Globulin 2.5 gm/dL    A/G Ratio 1.8 g/dL    Total Bilirubin 0.3 0.1 - 1.2 mg/dL    Alkaline Phosphatase 125 (H) 39 - 117 U/L    AST (SGOT) 16 1 - 32 U/L    ALT (SGPT) 12 1 - 33 U/L   Lipid panel   Result Value Ref Range    Total Cholesterol 244 (H) 0 - 200 mg/dL    Triglycerides 96 0 - 150 mg/dL    HDL Cholesterol 98 (H) 40 - 60 mg/dL    VLDL Cholesterol 19.2 5 - 40 mg/dL    LDL Cholesterol  127 (H) 0 - 100 mg/dL   TSH   Result Value Ref Range    TSH 4.020 0.270 - 4.200 mIU/mL   Hemoglobin A1c   Result Value Ref Range    Hemoglobin A1C 5.50 4.80 - 5.60 %   Vitamin D 25 Hydroxy   Result Value Ref Range    25 Hydroxy, Vitamin D 34.7 30.0 - 100.0 ng/ml   CBC and Differential   Result Value Ref Range    WBC " 7.43 4.50 - 10.70 10*3/mm3    RBC 4.66 3.90 - 5.20 10*6/mm3    Hemoglobin 13.8 11.9 - 15.5 g/dL    Hematocrit 44.5 35.6 - 45.5 %    MCV 95.5 80.5 - 98.2 fL    MCH 29.6 26.9 - 32.0 pg    MCHC 31.0 (L) 32.4 - 36.3 g/dL    RDW 13.4 (H) 11.7 - 13.0 %    Platelets 284 140 - 500 10*3/mm3    Neutrophil Rel % 53.6 42.7 - 76.0 %    Lymphocyte Rel % 37.1 19.6 - 45.3 %    Monocyte Rel % 6.5 5.0 - 12.0 %    Eosinophil Rel % 2.4 0.3 - 6.2 %    Basophil Rel % 0.4 0.0 - 1.5 %    Neutrophils Absolute 3.98 1.90 - 8.10 10*3/mm3    Lymphocytes Absolute 2.76 0.90 - 4.80 10*3/mm3    Monocytes Absolute 0.48 0.20 - 1.20 10*3/mm3    Eosinophils Absolute 0.18 0.00 - 0.70 10*3/mm3    Basophils Absolute 0.03 0.00 - 0.20 10*3/mm3    Immature Granulocyte Rel % 0.0 0.0 - 0.5 %    Immature Grans Absolute 0.00 0.00 - 0.03 10*3/mm3         The following portions of the patient's history were reviewed and updated as appropriate: allergies, current medications, past family history, past medical history, past social history, past surgical history and problem list.    Review of Systems   Constitutional: Negative for unexpected weight change.   Respiratory: Negative for shortness of breath.    Cardiovascular: Negative for chest pain and palpitations.   Psychiatric/Behavioral: Negative for behavioral problems, self-injury and suicidal ideas. The patient is nervous/anxious.        Objective   Physical Exam   Constitutional: She is oriented to person, place, and time. She appears well-developed and well-nourished.   Neck: Carotid bruit is not present.   Cardiovascular: Normal rate and regular rhythm.   Pulmonary/Chest: Effort normal and breath sounds normal.   Neurological: She is alert and oriented to person, place, and time.   Psychiatric: She has a normal mood and affect. Her behavior is normal. Judgment and thought content normal.   Nursing note and vitals reviewed.      Assessment/Plan   Bethany was seen today for hypertension, anxiety and  depression.    Diagnoses and all orders for this visit:    Essential hypertension  -     Comprehensive metabolic panel  -     Lipid panel  -     CBC and Differential  -     TSH    Anxiety  -     escitalopram (Lexapro) 10 MG tablet; Take 1 tablet by mouth Daily.        D/c lisinopril and f/u in 1-2 months for recheck.  Continue to monitor at home.

## 2020-06-09 LAB
ALBUMIN SERPL-MCNC: 4.5 G/DL (ref 3.5–5.2)
ALBUMIN/GLOB SERPL: 1.9 G/DL
ALP SERPL-CCNC: 131 U/L (ref 39–117)
ALT SERPL-CCNC: 24 U/L (ref 1–33)
AST SERPL-CCNC: 28 U/L (ref 1–32)
BASOPHILS # BLD AUTO: 0.05 10*3/MM3 (ref 0–0.2)
BASOPHILS NFR BLD AUTO: 0.7 % (ref 0–1.5)
BILIRUB SERPL-MCNC: 0.3 MG/DL (ref 0.2–1.2)
BUN SERPL-MCNC: 17 MG/DL (ref 8–23)
BUN/CREAT SERPL: 22.4 (ref 7–25)
CALCIUM SERPL-MCNC: 10.7 MG/DL (ref 8.6–10.5)
CHLORIDE SERPL-SCNC: 104 MMOL/L (ref 98–107)
CHOLEST SERPL-MCNC: 213 MG/DL (ref 0–200)
CO2 SERPL-SCNC: 29.2 MMOL/L (ref 22–29)
CREAT SERPL-MCNC: 0.76 MG/DL (ref 0.57–1)
EOSINOPHIL # BLD AUTO: 0.11 10*3/MM3 (ref 0–0.4)
EOSINOPHIL NFR BLD AUTO: 1.6 % (ref 0.3–6.2)
ERYTHROCYTE [DISTWIDTH] IN BLOOD BY AUTOMATED COUNT: 12.5 % (ref 12.3–15.4)
GLOBULIN SER CALC-MCNC: 2.4 GM/DL
GLUCOSE SERPL-MCNC: 82 MG/DL (ref 65–99)
HCT VFR BLD AUTO: 42.7 % (ref 34–46.6)
HDLC SERPL-MCNC: 76 MG/DL (ref 40–60)
HGB BLD-MCNC: 14.2 G/DL (ref 12–15.9)
IMM GRANULOCYTES # BLD AUTO: 0.02 10*3/MM3 (ref 0–0.05)
IMM GRANULOCYTES NFR BLD AUTO: 0.3 % (ref 0–0.5)
LDLC SERPL CALC-MCNC: 116 MG/DL (ref 0–100)
LYMPHOCYTES # BLD AUTO: 2.69 10*3/MM3 (ref 0.7–3.1)
LYMPHOCYTES NFR BLD AUTO: 39.8 % (ref 19.6–45.3)
MCH RBC QN AUTO: 30 PG (ref 26.6–33)
MCHC RBC AUTO-ENTMCNC: 33.3 G/DL (ref 31.5–35.7)
MCV RBC AUTO: 90.1 FL (ref 79–97)
MONOCYTES # BLD AUTO: 0.53 10*3/MM3 (ref 0.1–0.9)
MONOCYTES NFR BLD AUTO: 7.8 % (ref 5–12)
NEUTROPHILS # BLD AUTO: 3.36 10*3/MM3 (ref 1.7–7)
NEUTROPHILS NFR BLD AUTO: 49.8 % (ref 42.7–76)
NRBC BLD AUTO-RTO: 0 /100 WBC (ref 0–0.2)
PLATELET # BLD AUTO: 340 10*3/MM3 (ref 140–450)
POTASSIUM SERPL-SCNC: 5 MMOL/L (ref 3.5–5.2)
PROT SERPL-MCNC: 6.9 G/DL (ref 6–8.5)
RBC # BLD AUTO: 4.74 10*6/MM3 (ref 3.77–5.28)
SODIUM SERPL-SCNC: 142 MMOL/L (ref 136–145)
TRIGL SERPL-MCNC: 107 MG/DL (ref 0–150)
TSH SERPL DL<=0.005 MIU/L-ACNC: 2.47 UIU/ML (ref 0.27–4.2)
VLDLC SERPL CALC-MCNC: 21.4 MG/DL (ref 5–40)
WBC # BLD AUTO: 6.76 10*3/MM3 (ref 3.4–10.8)

## 2020-06-15 DIAGNOSIS — E55.9 VITAMIN D DEFICIENCY, UNSPECIFIED: ICD-10-CM

## 2020-06-15 DIAGNOSIS — R89.9 ABNORMAL LABORATORY TEST: Primary | ICD-10-CM

## 2020-06-18 LAB
25(OH)D3+25(OH)D2 SERPL-MCNC: 34.3 NG/ML (ref 30–100)
CA-I SERPL ISE-MCNC: 5.8 MG/DL (ref 4.5–5.6)
PTH-INTACT SERPL-MCNC: 81 PG/ML (ref 15–65)

## 2020-06-19 DIAGNOSIS — R79.9 ABNORMAL BLOOD CHEMISTRY LEVEL: Primary | ICD-10-CM

## 2020-08-03 ENCOUNTER — OFFICE VISIT (OUTPATIENT)
Dept: FAMILY MEDICINE CLINIC | Facility: CLINIC | Age: 75
End: 2020-08-03

## 2020-08-03 VITALS
TEMPERATURE: 97.5 F | RESPIRATION RATE: 16 BRPM | HEART RATE: 66 BPM | SYSTOLIC BLOOD PRESSURE: 140 MMHG | BODY MASS INDEX: 27 KG/M2 | OXYGEN SATURATION: 95 % | WEIGHT: 172 LBS | DIASTOLIC BLOOD PRESSURE: 100 MMHG | HEIGHT: 67 IN

## 2020-08-03 DIAGNOSIS — Z01.818 PREOPERATIVE CLEARANCE: Primary | ICD-10-CM

## 2020-08-03 PROCEDURE — 99213 OFFICE O/P EST LOW 20 MIN: CPT | Performed by: NURSE PRACTITIONER

## 2020-08-03 PROCEDURE — 93000 ELECTROCARDIOGRAM COMPLETE: CPT | Performed by: NURSE PRACTITIONER

## 2020-08-03 NOTE — PROGRESS NOTES
Procedure     ECG 12 Lead  Date/Time: 8/3/2020 3:34 PM  Performed by: Shameka Rodriguez APRN  Authorized by: Shameka Rodriguez APRN   Comparison: not compared with previous ECG   Rhythm: sinus rhythm  Ectopy: atrial premature contractions  Rate: normal  Conduction: conduction normal  ST Segments: ST segments normal  T Waves: T waves normal  QRS axis: normal  Other: no other findings    Clinical impression: normal ECG  Comments: P//176 ms   ms  QT/QTc 416/426 ms  HR 63

## 2020-08-03 NOTE — PROGRESS NOTES
Subjective   Bethany Weaver is a 74 y.o. female.     History of Present Illness   Patient presents to office for preop clearance. She is to have parathyroidectomy per Dr. Parker and is required to have EKG prior.  Patient denies any acute URI symptoms.    The following portions of the patient's history were reviewed and updated as appropriate: allergies, current medications, past family history, past medical history, past social history, past surgical history and problem list.    Review of Systems   Constitutional: Negative for unexpected weight change.   HENT: Negative for congestion, sinus pain and sore throat.    Respiratory: Negative for cough and shortness of breath.    Cardiovascular: Negative for chest pain and palpitations.   Psychiatric/Behavioral: Negative for behavioral problems.       Objective   Physical Exam   Constitutional: She is oriented to person, place, and time. She appears well-developed and well-nourished.   Cardiovascular: Normal rate and regular rhythm.   Pulmonary/Chest: Effort normal and breath sounds normal.   Neurological: She is alert and oriented to person, place, and time.   Psychiatric: She has a normal mood and affect. Her behavior is normal. Judgment and thought content normal.   Nursing note and vitals reviewed.      Assessment/Plan   There are no diagnoses linked to this encounter.

## 2020-10-15 ENCOUNTER — OFFICE VISIT (OUTPATIENT)
Dept: FAMILY MEDICINE CLINIC | Facility: CLINIC | Age: 75
End: 2020-10-15

## 2020-10-15 VITALS
HEART RATE: 70 BPM | SYSTOLIC BLOOD PRESSURE: 162 MMHG | DIASTOLIC BLOOD PRESSURE: 78 MMHG | WEIGHT: 176 LBS | TEMPERATURE: 97.1 F | HEIGHT: 67 IN | OXYGEN SATURATION: 100 % | BODY MASS INDEX: 27.62 KG/M2

## 2020-10-15 DIAGNOSIS — F41.9 ANXIETY: ICD-10-CM

## 2020-10-15 DIAGNOSIS — R41.3 MEMORY CHANGES: Primary | ICD-10-CM

## 2020-10-15 PROBLEM — I10 ESSENTIAL (PRIMARY) HYPERTENSION: Status: ACTIVE | Noted: 2020-10-15

## 2020-10-15 PROBLEM — G47.00 CANNOT SLEEP: Status: ACTIVE | Noted: 2020-10-15

## 2020-10-15 PROBLEM — F32.A ANXIETY AND DEPRESSION: Status: ACTIVE | Noted: 2020-10-15

## 2020-10-15 PROCEDURE — 99212 OFFICE O/P EST SF 10 MIN: CPT | Performed by: NURSE PRACTITIONER

## 2020-10-15 NOTE — PROGRESS NOTES
Subjective   Bethany Weaver is a 74 y.o. female.     History of Present Illness   Patient presents to office accompanied by and at the request of her daughter.  Patient is anxious and upset about visit as her daughter scheduled and she was unaware that she was coming to appt. She believes this is why her BP is elevated.  Patient's daughter is concerned about her mother's memory.  She states she has noticed her being more forgetful and slower to recall events.  Patient does report being slow to remember things at times but denies significant memory changes.  Patient recently had parathyroidectomy per Dr. Parker.      She does report stress due to some family relationships.  She is taking Lexapro and feels that it is helping. She does not want to increase the dose.   The following portions of the patient's history were reviewed and updated as appropriate: allergies, current medications, past family history, past medical history, past social history, past surgical history and problem list.    Review of Systems   Constitutional: Negative for unexpected weight change.   Respiratory: Negative for shortness of breath.    Cardiovascular: Negative for chest pain and palpitations.   Psychiatric/Behavioral: Negative for behavioral problems, confusion and self-injury. The patient is nervous/anxious.        Objective   Physical Exam  Vitals signs and nursing note reviewed.   Constitutional:       Appearance: She is well-developed.   Cardiovascular:      Rate and Rhythm: Normal rate.   Pulmonary:      Effort: Pulmonary effort is normal.   Neurological:      Mental Status: She is alert and oriented to person, place, and time.   Psychiatric:         Attention and Perception: Attention normal.         Mood and Affect: Mood is anxious.         Speech: Speech normal.         Behavior: Behavior is agitated.         Thought Content: Thought content normal.         Cognition and Memory: Cognition normal.         Judgment: Judgment  normal.         Assessment/Plan   Diagnoses and all orders for this visit:    1. Memory changes (Primary)    2. Anxiety             Patient declines neuropscyh or neurology referral at this time as she does not believe she has memory issues.  She states she thinks she is just very stressed with family dynamics.  She would like to wait until after her next f/u with Dr. Parker.  If she notices any memory change she will consider referral. Will have her f/u in a few months to see how she is doing.

## 2021-02-27 ENCOUNTER — IMMUNIZATION (OUTPATIENT)
Dept: VACCINE CLINIC | Facility: HOSPITAL | Age: 76
End: 2021-02-27

## 2021-02-27 PROCEDURE — 0001A: CPT | Performed by: INTERNAL MEDICINE

## 2021-02-27 PROCEDURE — 91300 HC SARSCOV02 VAC 30MCG/0.3ML IM: CPT | Performed by: INTERNAL MEDICINE

## 2021-03-20 ENCOUNTER — IMMUNIZATION (OUTPATIENT)
Dept: VACCINE CLINIC | Facility: HOSPITAL | Age: 76
End: 2021-03-20

## 2021-03-20 PROCEDURE — 0002A: CPT | Performed by: INTERNAL MEDICINE

## 2021-03-20 PROCEDURE — 91300 HC SARSCOV02 VAC 30MCG/0.3ML IM: CPT | Performed by: INTERNAL MEDICINE

## 2021-06-03 DIAGNOSIS — F41.9 ANXIETY: ICD-10-CM

## 2021-06-04 RX ORDER — ESCITALOPRAM OXALATE 10 MG/1
TABLET ORAL
Qty: 30 TABLET | Refills: 0 | Status: SHIPPED | OUTPATIENT
Start: 2021-06-04 | End: 2021-08-18 | Stop reason: SDUPTHER

## 2021-08-05 DIAGNOSIS — F41.9 ANXIETY: ICD-10-CM

## 2021-08-06 RX ORDER — ESCITALOPRAM OXALATE 10 MG/1
TABLET ORAL
Qty: 30 TABLET | Refills: 0 | OUTPATIENT
Start: 2021-08-06

## 2021-08-18 ENCOUNTER — OFFICE VISIT (OUTPATIENT)
Dept: FAMILY MEDICINE CLINIC | Facility: CLINIC | Age: 76
End: 2021-08-18

## 2021-08-18 VITALS
OXYGEN SATURATION: 100 % | HEIGHT: 67 IN | BODY MASS INDEX: 30.76 KG/M2 | SYSTOLIC BLOOD PRESSURE: 122 MMHG | DIASTOLIC BLOOD PRESSURE: 70 MMHG | TEMPERATURE: 97.1 F | RESPIRATION RATE: 16 BRPM | WEIGHT: 196 LBS | HEART RATE: 67 BPM

## 2021-08-18 DIAGNOSIS — R41.3 MEMORY DEFICIT: Primary | ICD-10-CM

## 2021-08-18 DIAGNOSIS — F41.9 ANXIETY: ICD-10-CM

## 2021-08-18 PROCEDURE — 99213 OFFICE O/P EST LOW 20 MIN: CPT | Performed by: NURSE PRACTITIONER

## 2021-08-18 RX ORDER — ESCITALOPRAM OXALATE 10 MG/1
10 TABLET ORAL DAILY
Qty: 90 TABLET | Refills: 3 | Status: SHIPPED | OUTPATIENT
Start: 2021-08-18 | End: 2022-09-13

## 2021-08-18 NOTE — PROGRESS NOTES
Subjective   Bethany Weaver is a 75 y.o. female.   Daughter is present for visit.   History of Present Illness   Answers for HPI/ROS submitted by the patient on 8/17/2021  Please describe your symptoms.: Sleeping during the day. Goes to bed early 8-9 wakes 8-9.  Then might sleep 3-4 more hours. , Trouble remembering day, , times to meet people  or activites, get details very confused from one day to the next. Then might be coherent next day.  Have you had these symptoms before?: Yes  How long have you been having these symptoms?: Greater than 2 weeks  Please list any medications you are currently taking for this condition.: She is supposed to take Lexspro but we font know uf she reslly is taking it dsily.  Please describe any probable cause for these symptoms. : Depression  What is the primary reason for your visit?: Other    Also needs refill on Lexapro for depression. Reports it was working well for her until she ran out of medication.    The following portions of the patient's history were reviewed and updated as appropriate: allergies, current medications, past family history, past medical history, past social history, past surgical history and problem list.    Review of Systems   Constitutional: Negative for unexpected weight change.   HENT: Negative for hearing loss.    Respiratory: Negative for shortness of breath.    Cardiovascular: Negative for chest pain and palpitations.   Psychiatric/Behavioral: Positive for confusion and dysphoric mood. Negative for agitation, behavioral problems, self-injury and suicidal ideas.       Objective   Physical Exam  Vitals and nursing note reviewed.   Constitutional:       Appearance: Normal appearance. She is well-developed.   Cardiovascular:      Rate and Rhythm: Normal rate.   Pulmonary:      Effort: Pulmonary effort is normal.   Neurological:      Mental Status: She is alert and oriented to person, place, and time.   Psychiatric:         Attention and Perception:  Attention normal.         Mood and Affect: Mood is anxious.         Behavior: Behavior normal.         Thought Content: Thought content normal.         Judgment: Judgment normal.         Assessment/Plan   Diagnoses and all orders for this visit:    1. Memory deficit (Primary)  -     Ambulatory Referral to Neurology    2. Anxiety  -     escitalopram (LEXAPRO) 10 MG tablet; Take 1 tablet by mouth Daily.  Dispense: 90 tablet; Refill: 3             MMSE score 12.

## 2021-08-27 ENCOUNTER — TELEPHONE (OUTPATIENT)
Dept: NEUROLOGY | Facility: CLINIC | Age: 76
End: 2021-08-27

## 2021-08-27 NOTE — TELEPHONE ENCOUNTER
Patient daughter Mona called into our HUB regarding the appointment that was scheduled for 09/14/2021 for her mother whom is a new MEMORY patient to be seen by Dr. Easton. She stated the appointment needed to be canceled and rescheduled. Roddy at the HUB canceled the appointment and rescheduled it for 10/13/2021. However at this exact same time of Roddy canceling and rescheduling the appointment, patients other daughter Mirella  was calling in and spoke with myself (Gen[place] NORWOOD) and wanted to know why we canceled the appointment for September. I told her that daughter Mona called in to our HUB (call center) and needed the appointment to be rescheduled. Mirella got upset and stated she has access to the patients chart and she will go in and cancel the appointment via [email or web address] as the September appointment worked for her as she was the one that was going to be bringing her mother to the appointment. She stated she would go in and cancel the appointment and request a new appointment and she would also change the information of the patient back to what it was because we changed that too. She then hung up on me. I sent a secure message to Roddy at the HUB and asked if she was still on the phone with the patient or daughter or both. Roddy then called me and stated the patient is suffering from memory loss issues as it is stated in the referral however she has her and daughter (Mona) on the phone and she had changed the patients contact information along with her appointment as this is what the patient wanted and she stated she only wanted Mona to be able to handle her care and making of appointments. Mona stated she is in the process of getting POA of her mother. Due to patient daughters not agreeing on appointments Neurology decided with Gisella Lopez (clinic coordinator in the loop of all things going on) it was in the best interest of all parties we get a POA on file along with a BH Verbal  Release as to alleviate any other further complications of the patients care here with Neurology.

## 2021-09-01 ENCOUNTER — TRANSCRIBE ORDERS (OUTPATIENT)
Dept: ADMINISTRATIVE | Facility: HOSPITAL | Age: 76
End: 2021-09-01

## 2021-09-01 DIAGNOSIS — Z78.0 POST-MENOPAUSAL: Primary | ICD-10-CM

## 2021-09-01 DIAGNOSIS — M85.80 OSTEOPENIA, UNSPECIFIED LOCATION: ICD-10-CM

## 2021-09-08 ENCOUNTER — TELEPHONE (OUTPATIENT)
Dept: FAMILY MEDICINE CLINIC | Facility: CLINIC | Age: 76
End: 2021-09-08

## 2021-09-08 NOTE — TELEPHONE ENCOUNTER
PATIENTS DAUGHTER JANETT BERNSTEIN CALLING IN REGARDS TO SPEAK WITH BANG MARCANO ASSISTANT ABOUT LAB DRAW PAPERWORK. PLEASE ADVISE THANK YOU!

## 2021-09-10 ENCOUNTER — HOSPITAL ENCOUNTER (OUTPATIENT)
Dept: CARDIOLOGY | Facility: HOSPITAL | Age: 76
Discharge: HOME | End: 2021-09-10

## 2021-09-10 ENCOUNTER — HOSPITAL ENCOUNTER (OUTPATIENT)
Dept: GENERAL RADIOLOGY | Facility: HOSPITAL | Age: 76
Discharge: HOME | End: 2021-09-10

## 2021-09-10 ENCOUNTER — LAB (OUTPATIENT)
Dept: LAB | Facility: HOSPITAL | Age: 76
End: 2021-09-10

## 2021-09-10 LAB
ABO GROUP BLD: NORMAL
ANION GAP SERPL CALCULATED.3IONS-SCNC: 7.9 MMOL/L (ref 5–15)
APTT PPP: 30.4 SECONDS (ref 24–31)
BACTERIA UR QL AUTO: ABNORMAL /HPF
BASOPHILS # BLD AUTO: 0.06 10*3/MM3 (ref 0–0.2)
BASOPHILS NFR BLD AUTO: 1.1 % (ref 0–1.5)
BILIRUB UR QL STRIP: NEGATIVE
BLD GP AB SCN SERPL QL: NEGATIVE
BUN SERPL-MCNC: 14 MG/DL (ref 8–23)
BUN/CREAT SERPL: 15.9 (ref 7–25)
CALCIUM SPEC-SCNC: 9.5 MG/DL (ref 8.6–10.5)
CHLORIDE SERPL-SCNC: 104 MMOL/L (ref 98–107)
CLARITY UR: CLEAR
CO2 SERPL-SCNC: 28.1 MMOL/L (ref 22–29)
COLOR UR: YELLOW
CREAT SERPL-MCNC: 0.88 MG/DL (ref 0.57–1)
DEPRECATED RDW RBC AUTO: 41.8 FL (ref 37–54)
EOSINOPHIL # BLD AUTO: 0.14 10*3/MM3 (ref 0–0.4)
EOSINOPHIL NFR BLD AUTO: 2.6 % (ref 0.3–6.2)
ERYTHROCYTE [DISTWIDTH] IN BLOOD BY AUTOMATED COUNT: 12.9 % (ref 12.3–15.4)
GFR SERPL CREATININE-BSD FRML MDRD: 63 ML/MIN/1.73
GLUCOSE SERPL-MCNC: 97 MG/DL (ref 65–99)
GLUCOSE UR STRIP-MCNC: NEGATIVE MG/DL
HCT VFR BLD AUTO: 43.3 % (ref 34–46.6)
HGB BLD-MCNC: 14.4 G/DL (ref 12–15.9)
HGB UR QL STRIP.AUTO: NEGATIVE
HYALINE CASTS UR QL AUTO: ABNORMAL /LPF
IMM GRANULOCYTES # BLD AUTO: 0.01 10*3/MM3 (ref 0–0.05)
IMM GRANULOCYTES NFR BLD AUTO: 0.2 % (ref 0–0.5)
INR PPP: 1.01 (ref 0.93–1.1)
KETONES UR QL STRIP: NEGATIVE
LEUKOCYTE ESTERASE UR QL STRIP.AUTO: ABNORMAL
LYMPHOCYTES # BLD AUTO: 2.06 10*3/MM3 (ref 0.7–3.1)
LYMPHOCYTES NFR BLD AUTO: 37.8 % (ref 19.6–45.3)
MCH RBC QN AUTO: 29.6 PG (ref 26.6–33)
MCHC RBC AUTO-ENTMCNC: 33.3 G/DL (ref 31.5–35.7)
MCV RBC AUTO: 89.1 FL (ref 79–97)
MONOCYTES # BLD AUTO: 0.43 10*3/MM3 (ref 0.1–0.9)
MONOCYTES NFR BLD AUTO: 7.9 % (ref 5–12)
MRSA DNA SPEC QL NAA+PROBE: ABNORMAL
NEUTROPHILS NFR BLD AUTO: 2.75 10*3/MM3 (ref 1.7–7)
NEUTROPHILS NFR BLD AUTO: 50.4 % (ref 42.7–76)
NITRITE UR QL STRIP: NEGATIVE
NRBC BLD AUTO-RTO: 0.2 /100 WBC (ref 0–0.2)
PH UR STRIP.AUTO: 6 [PH] (ref 5–8)
PLATELET # BLD AUTO: 316 10*3/MM3 (ref 140–450)
PMV BLD AUTO: 9.5 FL (ref 6–12)
POTASSIUM SERPL-SCNC: 4.7 MMOL/L (ref 3.5–5.2)
PROT UR QL STRIP: NEGATIVE
PROTHROMBIN TIME: 11.2 SECONDS (ref 9.6–11.7)
RBC # BLD AUTO: 4.86 10*6/MM3 (ref 3.77–5.28)
RBC # UR: ABNORMAL /HPF
REF LAB TEST METHOD: ABNORMAL
RH BLD: NEGATIVE
SODIUM SERPL-SCNC: 140 MMOL/L (ref 136–145)
SP GR UR STRIP: 1.02 (ref 1–1.03)
SQUAMOUS #/AREA URNS HPF: ABNORMAL /HPF
T&S EXPIRATION DATE: NORMAL
UROBILINOGEN UR QL STRIP: ABNORMAL
WBC # BLD AUTO: 5.45 10*3/MM3 (ref 3.4–10.8)
WBC UR QL AUTO: ABNORMAL /HPF

## 2021-09-10 PROCEDURE — 80048 BASIC METABOLIC PNL TOTAL CA: CPT

## 2021-09-10 PROCEDURE — 93005 ELECTROCARDIOGRAM TRACING: CPT | Performed by: ORTHOPAEDIC SURGERY

## 2021-09-10 PROCEDURE — 87641 MR-STAPH DNA AMP PROBE: CPT

## 2021-09-10 PROCEDURE — 85610 PROTHROMBIN TIME: CPT

## 2021-09-10 PROCEDURE — 85730 THROMBOPLASTIN TIME PARTIAL: CPT

## 2021-09-10 PROCEDURE — 71046 X-RAY EXAM CHEST 2 VIEWS: CPT

## 2021-09-10 PROCEDURE — 86900 BLOOD TYPING SEROLOGIC ABO: CPT

## 2021-09-10 PROCEDURE — 81001 URINALYSIS AUTO W/SCOPE: CPT

## 2021-09-10 PROCEDURE — 86901 BLOOD TYPING SEROLOGIC RH(D): CPT

## 2021-09-10 PROCEDURE — 85025 COMPLETE CBC W/AUTO DIFF WBC: CPT

## 2021-09-10 PROCEDURE — 93010 ELECTROCARDIOGRAM REPORT: CPT | Performed by: INTERNAL MEDICINE

## 2021-09-10 PROCEDURE — 86850 RBC ANTIBODY SCREEN: CPT

## 2021-09-10 PROCEDURE — 87086 URINE CULTURE/COLONY COUNT: CPT

## 2021-09-11 ENCOUNTER — LAB (OUTPATIENT)
Dept: LAB | Facility: HOSPITAL | Age: 76
End: 2021-09-11

## 2021-09-11 LAB — BACTERIA SPEC AEROBE CULT: NO GROWTH

## 2021-09-11 PROCEDURE — C9803 HOPD COVID-19 SPEC COLLECT: HCPCS

## 2021-09-11 PROCEDURE — U0004 COV-19 TEST NON-CDC HGH THRU: HCPCS

## 2021-09-12 LAB — SARS-COV-2 ORF1AB RESP QL NAA+PROBE: NOT DETECTED

## 2021-09-15 ENCOUNTER — LAB (OUTPATIENT)
Dept: LAB | Facility: HOSPITAL | Age: 76
End: 2021-09-15

## 2021-09-15 LAB — SARS-COV-2 ORF1AB RESP QL NAA+PROBE: NOT DETECTED

## 2021-09-15 PROCEDURE — U0004 COV-19 TEST NON-CDC HGH THRU: HCPCS

## 2021-09-15 PROCEDURE — C9803 HOPD COVID-19 SPEC COLLECT: HCPCS

## 2021-09-16 ENCOUNTER — ANESTHESIA EVENT (OUTPATIENT)
Dept: PERIOP | Facility: HOSPITAL | Age: 76
End: 2021-09-16

## 2021-09-16 ENCOUNTER — APPOINTMENT (OUTPATIENT)
Dept: LAB | Facility: HOSPITAL | Age: 76
End: 2021-09-16

## 2021-09-17 ENCOUNTER — HOSPITAL ENCOUNTER (INPATIENT)
Facility: HOSPITAL | Age: 76
LOS: 3 days | Discharge: NURSING, REHAB OR LONG-TERM CARE | End: 2021-09-22
Attending: ORTHOPAEDIC SURGERY | Admitting: ORTHOPAEDIC SURGERY

## 2021-09-17 ENCOUNTER — APPOINTMENT (OUTPATIENT)
Dept: GENERAL RADIOLOGY | Facility: HOSPITAL | Age: 76
End: 2021-09-17

## 2021-09-17 ENCOUNTER — ANESTHESIA (OUTPATIENT)
Dept: PERIOP | Facility: HOSPITAL | Age: 76
End: 2021-09-17

## 2021-09-17 DIAGNOSIS — Z96.659 STATUS POST TOTAL KNEE REPLACEMENT, UNSPECIFIED LATERALITY: Primary | ICD-10-CM

## 2021-09-17 LAB
ABO GROUP BLD: NORMAL
BLD GP AB SCN SERPL QL: NEGATIVE
QT INTERVAL: 417 MS
RH BLD: NEGATIVE
T&S EXPIRATION DATE: NORMAL

## 2021-09-17 PROCEDURE — C1889 IMPLANT/INSERT DEVICE, NOC: HCPCS | Performed by: ORTHOPAEDIC SURGERY

## 2021-09-17 PROCEDURE — 97162 PT EVAL MOD COMPLEX 30 MIN: CPT

## 2021-09-17 PROCEDURE — 86850 RBC ANTIBODY SCREEN: CPT | Performed by: ORTHOPAEDIC SURGERY

## 2021-09-17 PROCEDURE — G0378 HOSPITAL OBSERVATION PER HR: HCPCS

## 2021-09-17 PROCEDURE — 25010000002 CEFAZOLIN PER 500 MG: Performed by: ORTHOPAEDIC SURGERY

## 2021-09-17 PROCEDURE — 25010000002 ONDANSETRON PER 1 MG: Performed by: ANESTHESIOLOGY

## 2021-09-17 PROCEDURE — 0SRD0J9 REPLACEMENT OF LEFT KNEE JOINT WITH SYNTHETIC SUBSTITUTE, CEMENTED, OPEN APPROACH: ICD-10-PCS | Performed by: ORTHOPAEDIC SURGERY

## 2021-09-17 PROCEDURE — C1776 JOINT DEVICE (IMPLANTABLE): HCPCS | Performed by: ORTHOPAEDIC SURGERY

## 2021-09-17 PROCEDURE — 86901 BLOOD TYPING SEROLOGIC RH(D): CPT | Performed by: ORTHOPAEDIC SURGERY

## 2021-09-17 PROCEDURE — 73560 X-RAY EXAM OF KNEE 1 OR 2: CPT

## 2021-09-17 PROCEDURE — 25010000002 PROPOFOL 10 MG/ML EMULSION: Performed by: ANESTHESIOLOGY

## 2021-09-17 PROCEDURE — C1713 ANCHOR/SCREW BN/BN,TIS/BN: HCPCS | Performed by: ORTHOPAEDIC SURGERY

## 2021-09-17 PROCEDURE — 76942 ECHO GUIDE FOR BIOPSY: CPT | Performed by: ORTHOPAEDIC SURGERY

## 2021-09-17 PROCEDURE — 25010000002 MIDAZOLAM PER 1 MG: Performed by: ANESTHESIOLOGY

## 2021-09-17 PROCEDURE — 25010000002 HYDROMORPHONE PER 4 MG: Performed by: ANESTHESIOLOGY

## 2021-09-17 PROCEDURE — 86900 BLOOD TYPING SEROLOGIC ABO: CPT | Performed by: ORTHOPAEDIC SURGERY

## 2021-09-17 PROCEDURE — 25010000002 FENTANYL CITRATE (PF) 100 MCG/2ML SOLUTION: Performed by: ANESTHESIOLOGY

## 2021-09-17 DEVICE — JOURNEY 7.5 ROUND RESURF PAT 35MM STANDARD
Type: IMPLANTABLE DEVICE | Site: KNEE | Status: FUNCTIONAL
Brand: JOURNEY

## 2021-09-17 DEVICE — DEV WND/CLS CONTRL TISS STRATAFIX SYMM PDS PLS CTX 60CM VIL: Type: IMPLANTABLE DEVICE | Site: KNEE | Status: FUNCTIONAL

## 2021-09-17 DEVICE — JOURNEY II CR INSERT XLPE LEFT SIZE 3-4 9MM
Type: IMPLANTABLE DEVICE | Site: KNEE | Status: FUNCTIONAL
Brand: JOURNEY

## 2021-09-17 DEVICE — JOURNEY II CR FEMORAL OXINIUM NONPOROUS LEFT SIZE 5
Type: IMPLANTABLE DEVICE | Site: KNEE | Status: FUNCTIONAL
Brand: JOURNEY

## 2021-09-17 DEVICE — IMPLANTABLE DEVICE: Type: IMPLANTABLE DEVICE | Site: KNEE | Status: FUNCTIONAL

## 2021-09-17 DEVICE — CMT BONE PALACOS R HI/VISC 1X40: Type: IMPLANTABLE DEVICE | Site: KNEE | Status: FUNCTIONAL

## 2021-09-17 DEVICE — DEV CONTRL TISS STRATAFIX SPIRAL PDS PLS CT1 2-0 1/2 30CM: Type: IMPLANTABLE DEVICE | Site: KNEE | Status: FUNCTIONAL

## 2021-09-17 DEVICE — JOURNEY TIBIAL BASEPLATE NONPOROUS                                    LEFT SIZE 3
Type: IMPLANTABLE DEVICE | Site: KNEE | Status: FUNCTIONAL
Brand: JOURNEY

## 2021-09-17 RX ORDER — ACETAMINOPHEN 500 MG
TABLET ORAL AS NEEDED
Status: DISCONTINUED | OUTPATIENT
Start: 2021-09-17 | End: 2021-09-17 | Stop reason: SURG

## 2021-09-17 RX ORDER — OXYCODONE HCL 10 MG/1
TABLET, FILM COATED, EXTENDED RELEASE ORAL AS NEEDED
Status: DISCONTINUED | OUTPATIENT
Start: 2021-09-17 | End: 2021-09-17 | Stop reason: SURG

## 2021-09-17 RX ORDER — OXYCODONE HYDROCHLORIDE 5 MG/1
5 TABLET ORAL EVERY 4 HOURS PRN
Status: DISCONTINUED | OUTPATIENT
Start: 2021-09-17 | End: 2021-09-22 | Stop reason: HOSPADM

## 2021-09-17 RX ORDER — ESCITALOPRAM OXALATE 10 MG/1
10 TABLET ORAL DAILY
Status: DISCONTINUED | OUTPATIENT
Start: 2021-09-17 | End: 2021-09-22 | Stop reason: HOSPADM

## 2021-09-17 RX ORDER — HYDROMORPHONE HCL 110MG/55ML
1 PATIENT CONTROLLED ANALGESIA SYRINGE INTRAVENOUS EVERY 4 HOURS PRN
Status: DISCONTINUED | OUTPATIENT
Start: 2021-09-17 | End: 2021-09-22 | Stop reason: HOSPADM

## 2021-09-17 RX ORDER — NALOXONE HCL 0.4 MG/ML
0.1 VIAL (ML) INJECTION
Status: DISCONTINUED | OUTPATIENT
Start: 2021-09-17 | End: 2021-09-22 | Stop reason: HOSPADM

## 2021-09-17 RX ORDER — SODIUM CHLORIDE, SODIUM LACTATE, POTASSIUM CHLORIDE, CALCIUM CHLORIDE 600; 310; 30; 20 MG/100ML; MG/100ML; MG/100ML; MG/100ML
INJECTION, SOLUTION INTRAVENOUS CONTINUOUS PRN
Status: DISCONTINUED | OUTPATIENT
Start: 2021-09-17 | End: 2021-09-17 | Stop reason: SURG

## 2021-09-17 RX ORDER — OXYCODONE HYDROCHLORIDE 5 MG/1
10 TABLET ORAL EVERY 4 HOURS PRN
Status: DISCONTINUED | OUTPATIENT
Start: 2021-09-17 | End: 2021-09-22 | Stop reason: HOSPADM

## 2021-09-17 RX ORDER — TRANEXAMIC ACID 10 MG/ML
1000 INJECTION, SOLUTION INTRAVENOUS ONCE
Status: COMPLETED | OUTPATIENT
Start: 2021-09-17 | End: 2021-09-17

## 2021-09-17 RX ORDER — LIDOCAINE HYDROCHLORIDE 20 MG/ML
INJECTION, SOLUTION EPIDURAL; INFILTRATION; INTRACAUDAL; PERINEURAL AS NEEDED
Status: DISCONTINUED | OUTPATIENT
Start: 2021-09-17 | End: 2021-09-17 | Stop reason: SURG

## 2021-09-17 RX ORDER — SODIUM CHLORIDE 0.9 % (FLUSH) 0.9 %
10 SYRINGE (ML) INJECTION EVERY 12 HOURS SCHEDULED
Status: DISCONTINUED | OUTPATIENT
Start: 2021-09-17 | End: 2021-09-17 | Stop reason: HOSPADM

## 2021-09-17 RX ORDER — ONDANSETRON 4 MG/1
4 TABLET, FILM COATED ORAL EVERY 6 HOURS PRN
Status: DISCONTINUED | OUTPATIENT
Start: 2021-09-17 | End: 2021-09-22 | Stop reason: HOSPADM

## 2021-09-17 RX ORDER — DOCUSATE SODIUM 100 MG/1
100 CAPSULE, LIQUID FILLED ORAL 2 TIMES DAILY PRN
Status: DISCONTINUED | OUTPATIENT
Start: 2021-09-17 | End: 2021-09-22 | Stop reason: HOSPADM

## 2021-09-17 RX ORDER — SODIUM CHLORIDE 0.9 % (FLUSH) 0.9 %
10 SYRINGE (ML) INJECTION AS NEEDED
Status: DISCONTINUED | OUTPATIENT
Start: 2021-09-17 | End: 2021-09-17 | Stop reason: HOSPADM

## 2021-09-17 RX ORDER — SODIUM CHLORIDE 9 MG/ML
100 INJECTION, SOLUTION INTRAVENOUS CONTINUOUS
Status: DISCONTINUED | OUTPATIENT
Start: 2021-09-17 | End: 2021-09-22 | Stop reason: HOSPADM

## 2021-09-17 RX ORDER — ASPIRIN 325 MG
325 TABLET, DELAYED RELEASE (ENTERIC COATED) ORAL DAILY
Status: DISCONTINUED | OUTPATIENT
Start: 2021-09-18 | End: 2021-09-22 | Stop reason: HOSPADM

## 2021-09-17 RX ORDER — PROPOFOL 10 MG/ML
VIAL (ML) INTRAVENOUS AS NEEDED
Status: DISCONTINUED | OUTPATIENT
Start: 2021-09-17 | End: 2021-09-17 | Stop reason: SURG

## 2021-09-17 RX ORDER — LABETALOL HYDROCHLORIDE 5 MG/ML
INJECTION, SOLUTION INTRAVENOUS AS NEEDED
Status: DISCONTINUED | OUTPATIENT
Start: 2021-09-17 | End: 2021-09-17 | Stop reason: SURG

## 2021-09-17 RX ORDER — MIDAZOLAM HYDROCHLORIDE 1 MG/ML
INJECTION INTRAMUSCULAR; INTRAVENOUS AS NEEDED
Status: DISCONTINUED | OUTPATIENT
Start: 2021-09-17 | End: 2021-09-17 | Stop reason: SURG

## 2021-09-17 RX ORDER — PROMETHAZINE HYDROCHLORIDE 12.5 MG/1
12.5 TABLET ORAL EVERY 6 HOURS PRN
Status: DISCONTINUED | OUTPATIENT
Start: 2021-09-17 | End: 2021-09-22 | Stop reason: HOSPADM

## 2021-09-17 RX ORDER — ONDANSETRON 2 MG/ML
4 INJECTION INTRAMUSCULAR; INTRAVENOUS EVERY 6 HOURS PRN
Status: DISCONTINUED | OUTPATIENT
Start: 2021-09-17 | End: 2021-09-22 | Stop reason: HOSPADM

## 2021-09-17 RX ORDER — HYDROMORPHONE HCL 110MG/55ML
0.5 PATIENT CONTROLLED ANALGESIA SYRINGE INTRAVENOUS
Status: DISCONTINUED | OUTPATIENT
Start: 2021-09-17 | End: 2021-09-17 | Stop reason: HOSPADM

## 2021-09-17 RX ORDER — TRANEXAMIC ACID 10 MG/ML
1000 INJECTION, SOLUTION INTRAVENOUS ONCE
Status: DISCONTINUED | OUTPATIENT
Start: 2021-09-17 | End: 2021-09-17 | Stop reason: HOSPADM

## 2021-09-17 RX ORDER — FENTANYL CITRATE 50 UG/ML
INJECTION, SOLUTION INTRAMUSCULAR; INTRAVENOUS AS NEEDED
Status: DISCONTINUED | OUTPATIENT
Start: 2021-09-17 | End: 2021-09-17 | Stop reason: SURG

## 2021-09-17 RX ORDER — CELECOXIB 200 MG/1
CAPSULE ORAL AS NEEDED
Status: DISCONTINUED | OUTPATIENT
Start: 2021-09-17 | End: 2021-09-17 | Stop reason: SURG

## 2021-09-17 RX ORDER — SODIUM CHLORIDE, SODIUM LACTATE, POTASSIUM CHLORIDE, CALCIUM CHLORIDE 600; 310; 30; 20 MG/100ML; MG/100ML; MG/100ML; MG/100ML
9 INJECTION, SOLUTION INTRAVENOUS CONTINUOUS PRN
Status: DISCONTINUED | OUTPATIENT
Start: 2021-09-17 | End: 2021-09-17 | Stop reason: HOSPADM

## 2021-09-17 RX ORDER — GABAPENTIN 300 MG/1
CAPSULE ORAL AS NEEDED
Status: DISCONTINUED | OUTPATIENT
Start: 2021-09-17 | End: 2021-09-17 | Stop reason: SURG

## 2021-09-17 RX ORDER — BUPIVACAINE HYDROCHLORIDE 2.5 MG/ML
INJECTION, SOLUTION INFILTRATION; PERINEURAL AS NEEDED
Status: DISCONTINUED | OUTPATIENT
Start: 2021-09-17 | End: 2021-09-17 | Stop reason: HOSPADM

## 2021-09-17 RX ORDER — MELOXICAM 15 MG/1
15 TABLET ORAL DAILY
Status: DISCONTINUED | OUTPATIENT
Start: 2021-09-17 | End: 2021-09-22 | Stop reason: HOSPADM

## 2021-09-17 RX ORDER — ONDANSETRON 2 MG/ML
INJECTION INTRAMUSCULAR; INTRAVENOUS AS NEEDED
Status: DISCONTINUED | OUTPATIENT
Start: 2021-09-17 | End: 2021-09-17 | Stop reason: SURG

## 2021-09-17 RX ADMIN — MIDAZOLAM 2 MG: 1 INJECTION INTRAMUSCULAR; INTRAVENOUS at 11:34

## 2021-09-17 RX ADMIN — CEFAZOLIN SODIUM 2 G: 10 INJECTION, POWDER, FOR SOLUTION INTRAVENOUS at 20:47

## 2021-09-17 RX ADMIN — CEFAZOLIN SODIUM 2 G: 1 INJECTION, POWDER, FOR SOLUTION INTRAMUSCULAR; INTRAVENOUS at 11:37

## 2021-09-17 RX ADMIN — ONDANSETRON 4 MG: 2 INJECTION INTRAMUSCULAR; INTRAVENOUS at 12:32

## 2021-09-17 RX ADMIN — TRANEXAMIC ACID 1000 MG: 10 INJECTION, SOLUTION INTRAVENOUS at 11:38

## 2021-09-17 RX ADMIN — HYDROMORPHONE HYDROCHLORIDE 0.5 MG: 2 INJECTION, SOLUTION INTRAMUSCULAR; INTRAVENOUS; SUBCUTANEOUS at 13:20

## 2021-09-17 RX ADMIN — SODIUM CHLORIDE 100 ML/HR: 9 INJECTION, SOLUTION INTRAVENOUS at 14:56

## 2021-09-17 RX ADMIN — GABAPENTIN 300 MG: 300 CAPSULE ORAL at 10:22

## 2021-09-17 RX ADMIN — FENTANYL CITRATE 100 MCG: 50 INJECTION, SOLUTION INTRAMUSCULAR; INTRAVENOUS at 11:34

## 2021-09-17 RX ADMIN — OXYCODONE HYDROCHLORIDE 10 MG: 10 TABLET, FILM COATED, EXTENDED RELEASE ORAL at 10:22

## 2021-09-17 RX ADMIN — CELECOXIB 200 MG: 200 CAPSULE ORAL at 10:22

## 2021-09-17 RX ADMIN — OXYCODONE 10 MG: 5 TABLET ORAL at 14:53

## 2021-09-17 RX ADMIN — PROPOFOL 150 MG: 10 INJECTION, EMULSION INTRAVENOUS at 11:34

## 2021-09-17 RX ADMIN — SODIUM CHLORIDE, POTASSIUM CHLORIDE, SODIUM LACTATE AND CALCIUM CHLORIDE 9 ML/HR: 600; 310; 30; 20 INJECTION, SOLUTION INTRAVENOUS at 09:51

## 2021-09-17 RX ADMIN — SODIUM CHLORIDE, SODIUM LACTATE, POTASSIUM CHLORIDE, AND CALCIUM CHLORIDE: .6; .31; .03; .02 INJECTION, SOLUTION INTRAVENOUS at 11:31

## 2021-09-17 RX ADMIN — LIDOCAINE HYDROCHLORIDE 50 MG: 20 INJECTION, SOLUTION EPIDURAL; INFILTRATION; INTRACAUDAL; PERINEURAL at 11:34

## 2021-09-17 RX ADMIN — ACETAMINOPHEN 1000 MG: 500 TABLET, FILM COATED ORAL at 10:22

## 2021-09-17 RX ADMIN — PROPOFOL 120 MCG/KG/MIN: 10 INJECTION, EMULSION INTRAVENOUS at 11:35

## 2021-09-17 RX ADMIN — LABETALOL 20 MG/4 ML (5 MG/ML) INTRAVENOUS SYRINGE 5 MG: at 11:52

## 2021-09-17 NOTE — PLAN OF CARE
Goal Outcome Evaluation:  Plan of Care Reviewed With: patient, daughter           Outcome Summary: pt admission complete. sleepy with dtr at [facility].

## 2021-09-17 NOTE — ANESTHESIA PREPROCEDURE EVALUATION
Anesthesia Evaluation     Patient summary reviewed and Nursing notes reviewed   no history of anesthetic complications:  NPO Solid Status: > 8 hours  NPO Liquid Status: > 2 hours           Airway   Dental      Pulmonary    Cardiovascular     ECG reviewed    (+) hypertension, hyperlipidemia,       Neuro/Psych  (+) psychiatric history Anxiety and Depression,     GI/Hepatic/Renal/Endo      Musculoskeletal     Abdominal    Substance History      OB/GYN          Other   arthritis,      ROS/Med Hx Other: Osteopenia, allergies, sleep d/o    PSH  APPENDECTOMY PARATHYROIDECTOMY                   Anesthesia Plan    ASA 2     general with block   total IV anesthesia(Patient identified; pre-operative vital signs, all relevant labs/studies, complete medical/surgical/anesthetic history, full medication list, full allergy list, and NPO status obtained/reviewed; physical assessment performed; anesthetic options, side effects, potential complications, risks, and benefits discussed; questions answered; written anesthesia consent obtained; patient cleared for procedure; anesthesia machine and equipment checked and functioning    ERAS)  intravenous induction     Anesthetic plan, all risks, benefits, and alternatives have been provided, discussed and informed consent has been obtained with: patient.    Plan discussed with CRNA and CAA.

## 2021-09-17 NOTE — ANESTHESIA PROCEDURE NOTES
Airway  Urgency: elective    Date/Time: 9/17/2021 11:36 AM  End Time:9/17/2021 11:36 AM  Airway not difficult    General Information and Staff    Patient location during procedure: OR  Anesthesiologist: Marquise Cisneros MD    Indications and Patient Condition  Indications for airway management: airway protection    Preoxygenated: yes  Mask difficulty assessment: 1 - vent by mask    Final Airway Details  Final airway type: supraglottic airway      Successful airway: LMA  Size 4    Cormack-Lehane Classification: grade IV - neither glottis nor epiglottis seen  Number of attempts at approach: 1  Assessment: lips, teeth, and gum same as pre-op and atraumatic intubation    Additional Comments  X1 WITH EASE. MOCP. GAUZE BITE BLOCK

## 2021-09-17 NOTE — THERAPY EVALUATION
Patient Name: Bethany Weaver  : 1945    MRN: [id number]                              Today's Date: 2021       Admit Date: 2021    Visit Dx: No diagnosis found.  Patient Active Problem List   Diagnosis   • Osteopenia of multiple sites   • Anxiety and depression   • Cannot sleep   • HLD (hyperlipidemia)   • Essential (primary) hypertension   • OA (osteoarthritis)   • Total knee replacement status     Past Medical History:   Diagnosis Date   • Arthritis    • Depression      Past Surgical History:   Procedure Laterality Date   • APPENDECTOMY     • PARATHYROIDECTOMY     • TOTAL KNEE ARTHROPLASTY Right      General Information     Row Name 21 1519          Physical Therapy Time and Intention    Document Type  evaluation  -NK     Mode of Treatment  physical therapy  -NK     Row Name 21 1519          General Information    Patient Profile Reviewed  yes  -NK     Prior Level of Function  -- Daughter reports independent, has cane  -NK     Existing Precautions/Restrictions  -- WBAT  -NK     Row Name 21 1519          Living Environment    Lives With  alone Pt's daughter stated pt plans to go home with her  -NK     Row Name 21 1519          Home Main Entrance    Number of Stairs, Main Entrance  none  -NK     Row Name 21 1519          Cognition    Orientation Status (Cognition)  unable/difficult to assess Pt with significant lethargy d/t anesthesia  -NK     Row Name 21 1600          Safety Issues, Functional Mobility    Safety Issues Affecting Function (Mobility)  ability to follow commands;insight into deficits/self-awareness  -NK     Impairments Affecting Function (Mobility)  cognition;range of motion (ROM);pain;endurance/activity tolerance  -NK     Cognitive Impairments, Mobility Safety/Performance  attention  -NK       User Key  (r) = Recorded By, (t) = Taken By, (c) = Cosigned By    Initials Name Provider Type    NK Anne Kate, PT Student PT Student         Mobility     Row Name 09/17/21 1526          Bed Mobility    Bed Mobility  bed mobility (all) activities  -NK     All Activities, [place] (Bed Mobility)  moderate assist (50% patient effort)  -NK     Assistive Device (Bed Mobility)  bed rails;draw sheet  -NK     Row Name 09/17/21 1526          Bed-Chair Transfer    Bed-Chair [place] (Transfers)  moderate assist (50% patient effort)  -NK     Assistive Device (Bed-Chair Transfers)  walker, front-wheeled  -NK     Row Name 09/17/21 1526          Sit-Stand Transfer    Sit-Stand [place] (Transfers)  moderate assist (50% patient effort)  -NK     Assistive Device (Sit-Stand Transfers)  walker, front-wheeled  -NK       User Key  (r) = Recorded By, (t) = Taken By, (c) = Cosigned By    Initials Name Provider Type    Anne Gaston, PT Student PT Student        Obj/Interventions     Row Name 09/17/21 1531          Range of Motion Comprehensive    Comment, General Range of Motion  L knee AAROM lacking 5 degrees from neutral knee extension to 90 degrees  -NK     Row Name 09/17/21 1531          Strength Comprehensive (MMT)    Comment, General Manual Muscle Testing (MMT) Assessment  L knee 3-/5  -NK     Row Name 09/17/21 1531          Motor Skills    Therapeutic Exercise  -- Pt completed 2x AAROM-PROM L heel slides, ankle pumps, LAQ  -NK     Row Name 09/17/21 1531          Lower Extremity (Manual Muscle Testing)    Lower Extremity: Manual Muscle Testing (MMT)  left knee strength deficit  -NK     Comment, MMT: Lower Extremity  3-/5  -NK     Row Name 09/17/21 1531          General Lower Extremity Assessment (Range of Motion)    Lower Extremity: Range of Motion  knee, left: LE ROM  -NK       User Key  (r) = Recorded By, (t) = Taken By, (c) = Cosigned By    Initials Name Provider Type    Anne Gaston, PT Student PT Student        Goals/Plan     Row Name 09/17/21 1549          Transfer Goal 1 (PT)    Activity/Assistive Device (Transfer Goal 1, PT)  transfers,  all  -NK     [place] Level/Cues Needed (Transfer Goal 1, PT)  modified independence  -NK     Time Frame (Transfer Goal 1, PT)  long term goal (LTG);2 weeks  -NK     Row Name 09/17/21 1549          Gait Training Goal 1 (PT)    Activity/Assistive Device (Gait Training Goal 1, PT)  gait (walking locomotion);assistive device use  -NK     Distance (Gait Training Goal 1, PT)  100  -NK     Row Name 09/17/21 1549          Stairs Goal 1 (PT)    Activity/Assistive Device (Stairs Goal 1, PT)  ascending stairs;descending stairs;using handrail, left  -NK     [place] Level/Cues Needed (Stairs Goal 1, PT)  modified independence  -NK     Number of Stairs (Stairs Goal 1, PT)  4  -NK     Time Frame (Stairs Goal 1, PT)  long term goal (LTG);2 weeks  -NK     Row Name 09/17/21 1549          Patient Education Goal (PT)    Activity (Patient Education Goal, PT)  Pt with assist of daughter will be independent with HEP  -NK     [place]/Cues/Accuracy (Memory Goal 2, PT)  verbalizes understanding  -NK     Time Frame (Patient Education Goal, PT)  long term goal (LTG);2 weeks  -NK       User Key  (r) = Recorded By, (t) = Taken By, (c) = Cosigned By    Initials Name Provider Type    NK Anne Kate, PT Student PT Student        Clinical Impression     Row Name 09/17/21 1537          Pain    Additional Documentation  Pain Scale: FACES Pre/Post-Treatment (Group)  -NK     Row Name 09/17/21 1537          Pain Scale: FACES Pre/Post-Treatment    Pain: FACES Scale, Pretreatment  6-->hurts even more  -NK     Posttreatment Pain Rating  6-->hurts even more  -NK     Pain Location  knee  -NK     Pre/Posttreatment Pain Comment  Rn administered medication during evaluation  -NK     Row Name 09/17/21 9302          Plan of Care Review    Plan of Care Reviewed With  patient;daughter  -NK     Progress  no change  -NK     Outcome Summary  Pt is a 75 y.o. female who presents with end-stage arthritis in L knee. Pt admitted to [facility] for  L total knee arthroplasty on 9/17/21. Pt plans to return home with daughter who is able to provide 24/7 care at this time. Pt will be staying on one floor until able to ascend/descend stairs appropriately. Pt was disoriented and lethargic during evaluation today due to lingering anesthesia. Pt’s daughter stated patient has some memory loss and is planning formal evaluation soon. Pt completed TKA protocol exercises with AAR-PROM d/t lethargy. Pt was mod A for transfer to chair with use of RW and a few ambulatory steps to turn and sit. Family was educated on exercise protocol and given written exercise plan. Pt plans to begin home health after d/c home with 24/7 assist from daughter. Will continue to follow while in [facility].  -NK     Row Name 09/17/21 1538          Therapy Assessment/Plan (PT)    Rehab Potential (PT)  good, to achieve stated therapy goals  -NK     Criteria for Skilled Interventions Met (PT)  yes;skilled treatment is necessary  -NK     Predicted Duration of Therapy Intervention (PT)  until d/c  -NK     Row Name 09/17/21 1537          Positioning and Restraints    Pre-Treatment Position  in bed  -NK     Post Treatment Position  chair  -NK     In Chair  sitting;exit alarm on;reclined;call light within reach;LLE elevated  -NK       User Key  (r) = Recorded By, (t) = Taken By, (c) = Cosigned By    Initials Name Provider Type    Anne Gaston, PT Student PT Student        Outcome Measures     Row Name 09/17/21 7316          How much help from another person do you currently need...    Turning from your back to your side while in flat bed without using bedrails?  3  -NK     Moving from lying on back to sitting on the side of a flat bed without bedrails?  3  -NK     Moving to and from a bed to a chair (including a wheelchair)?  2  -NK     Standing up from a chair using your arms (e.g., wheelchair, bedside chair)?  2  -NK     Climbing 3-5 steps with a railing?  1  -NK     To walk in hospital room?  2  -NK      AM-PAC 6 Clicks Score (PT)  13  -NK     Row Name 09/17/21 1552          Functional Assessment    Outcome Measure Options  AM-PAC 6 Clicks Basic Mobility (PT)  -NK       User Key  (r) = Recorded By, (t) = Taken By, (c) = Cosigned By    Initials Name Provider Type    NK Anne Kate, PT Student PT Student                       Physical Therapy Education                 Title: PT OT SLP Therapies (In Progress)     Topic: Physical Therapy (In Progress)     Point: Mobility training (In Progress)     Learning Progress Summary           Patient Acceptance, E, NR by NK at 9/17/2021 1554                   Point: Home exercise program (In Progress)     Learning Progress Summary           Patient Acceptance, E, NR by NK at 9/17/2021 1554                   Point: Body mechanics (In Progress)     Learning Progress Summary           Patient Acceptance, E, NR by NK at 9/17/2021 1554                   Point: Precautions (In Progress)     Learning Progress Summary           Patient Acceptance, E, NR by NK at 9/17/2021 1554                               User Key     Initials Effective Dates Name Provider Type Discipline     08/09/21 -  Anne Kate, PT Student PT Student PT              PT Recommendation and Plan     Plan of Care Reviewed With: patient, daughter  Progress: no change  Outcome Summary: Pt is a 75 y.o. female who presents with end-stage arthritis in L knee. Pt admitted to [facility] for L total knee arthroplasty on 9/17/21. Pt plans to return home with daughter who is able to provide 24/7 care at this time. Pt will be staying on one floor until able to ascend/descend stairs appropriately. Pt was disoriented and lethargic during evaluation today due to lingering anesthesia. Pt’s daughter stated patient has some memory loss and is planning formal evaluation soon. Pt completed TKA protocol exercises with AAROM-PROM d/t lethargy. Pt was mod A for transfer to chair with use of RW and a few ambulatory steps  to turn and sit. Family was educated on exercise protocol and given written exercise plan. Pt plans to begin home health after d/c home with 24/7 assist from daughter. Will continue to follow while in [facility].     Time Calculation:   PT Charges     Row Name 09/17/21 1554             Time Calculation    Start Time  1440  -NK      Stop Time  1517  -NK      Time Calculation (min)  37 min  -NK      PT Received On  09/17/21  -NK      PT - Next Appointment  09/18/21  -NK      PT Goal Re-Cert Due Date  10/01/21  -NK         Time Calculation- PT    Total Timed Code Minutes- PT  0 minute(s)  -NK        User Key  (r) = Recorded By, (t) = Taken By, (c) = Cosigned By    Initials Name Provider Type    Erickson Gaston, PT Student PT Student        Therapy Charges for Today     Code Description Service Date Service Provider Modifiers Qty    77898424951 HC PT EVAL MOD COMPLEXITY 4 9/17/2021 Erickson Foss, PT Student GP 1          PT G-Codes  Outcome Measure Options: AM-PAC 6 Clicks Basic Mobility (PT)  AM-PAC 6 Clicks Score (PT): 13    ERICKSON FOSS PT Student  9/17/2021

## 2021-09-17 NOTE — OP NOTE
Total Knee Replacement Operative Note  Dr. TRISTAN Bill II  [phone]    PATIENT NAME: Bethany Weaver  MRN: [id number]  : 1945 AGE: 75 y.o. GENDER: female  DATE OF OPERATION: 2021  PREOPERATIVE DIAGNOSIS: End Stage Arthritis  POSTOPERATIVE DIAGNOSIS: Same  OPERATION PERFORMED: Left Total Knee Arthroplasty  SURGEON: Francis Bill MD  Circulator: Jie Sanches RN  Scrub Person: Dasia Cassidy; Rubén Jo  Assistant: Devorah Alonso PA  ANESTHESIA: General with Block  ASSISTANT: Devorah Alonso. This case would not have been possible without another set of skilled surgical hands for retraction, use of instrumentation, and general assistance.  This assistance was vital to the success of the case.   ESTIMATED BLOOD LOSS: 50cc  SPONGE AND NEEDLE COUNT: Correct  INDICATIONS:   A discussion of operative versus nonoperative treatment was had with the patient and they failed conservative management. They elected to undergo total knee arthroplasty. The risks of surgery were discussed and included the risk of anesthesia, infection, damage to neurovascular structures, implant loosening/failure, fracture, hardware prominence, continued pain, early failure, the need for further procedures, medical complications, and others. No guarantees were made. The patient wished to proceed with surgery and a surgical consent was signed.    COMPONENTS:   · Journey II CR Oxinium Femoral Component: Size 5  · Journey II Tibial Baseplate: 3   · CR Articular Insert: 9  · Patella: 35mm    PERTINENT FINDINGS: Degenerative Arthritis    DETAILS OF PROCEDURE:  The patient was met in the preoperative area. The site was marked. The consent and H&P were reviewed. The patient was then wheeled back to the operative suite and transferred to the operative table. The patient underwent anesthesia. A tourniquet was placed on the upper thigh. Surgical alcohol was used to thoroughly clean the entire operative extremity.     The leg  was then prepped in the normal sterile fashion and surgical space suits were used for the entire operative team. New outer gloves were used by all sterile surgical team members after final draping. After a surgical timeout, the tourniquet was inflated.     In flexion, a midline knee incision was utilized centered on the patella and ending medial to the tibial tubercle. Dissection was carried down to the knee capsule. A midvastus ararthrotomy was completed. The patellar fat pad was excised. The MCL was minimally elevated to gain adequate exposure to the knee.      The patella was subluxed laterally. The patella was held vertical using 2 clamps, and was then cut using a saw. The patella was then sized, and the lug holes were drilled. Excess patellar bone was removed using a saw. The patella was then protected during this case using the metal patella shield.    The femoral canal was breached using the reamer. The canal was thoroughly irrigated. The intramedullary femoral jig was inserted. The distal cutting block was pinned in place and held with a kocher clamp. The cut was made with an oscillating saw. With all saw cuts, the soft tissues were protected with retractors. The sizing jig was placed onto the femur and set to the desired amount of external rotation. Rotation was checked against the epicondylar axis and Whitesides line. The femur was then sized. The size matching block was placed and secured with pins. The cuts were then made with oscillating saw in a routine fashion. All bone cuts were removed.     The tibial canal was then breached using the entry drill. The canal was thoroughly irrigated. Next the intramedullary guide was inserted down the tibial canal. The cutting jig was aligned with the medial third of the tibial tubercle. The height of the cut was measured and the cutting jig was then secured with pins. The slope and varus/valgus positioning was checked with an extramedullary hussein which was attached to  the cutting jig.     The cut was then made using the oscillating saw, again ensuring that the retractors were in proper position to protect the collateral ligaments and the patellar tendon, as well as the neurovascular bundle and PCL posteriorly.     A lamina  was inserted into the notch with the knee in flexion and used to expose the posterior joint. Using a kocher and bovie the remaining medial and lateral menisci were removed. Excess posterior osteophytes were also removed with a osteotome, mallet, and rongeur as necessary.      Next a trial of the knee was performed using trial femoral and tibial trial components. Polyethylene trials were inserted as needed to gain appropriate stability. A drop hussein was once again used to assess the varus/valgus alignment of the knee and the knee was noted to be in excellent alignment. Soft tissue releases were then performed as necessary to fine-tune the balancing of the knee.  After taking the knee through one final range of motion, the tibial rotation of the trial was noted. The femoral lug holes were then drilled and the anterior femoral cut was finalized with a saw.    We next turned our attention back to the tibia to finish the tibial preparation. The tibia was measured and sized. The tibial plate was aligned with the rotation from the trialing process and verified to be positioned near the medial third of the tibial tubercle. The tibial surface was then prepared for the keel .    The knee was thoroughly irrigated with sterile saline using a pulse-lavage system while the final tibial baseplate, femoral component and patellar component were opened. Cement was prepared and mixed using standard techniques. Outer gloves were changed before implant handling to ensure no soft tissue or oily material was exposed to the surfaces of the final implants. The bony knee surfaces were dried and the implants were cemented in place, starting with the tibia, then the femur and  "finally the patella. Excess cement was removed at each step. A trial poly was utilized during cementation for compression. The tourniquet was taken down and adequate hemostasis was achieved. The knee was thoroughly irrigated once again.     The soft tissues about the knee were then injected with an anesthetic cocktail. Care was taken to avoid the peroneal nerve and the neurovascular bundle posteriorly. The cement was allowed to harden. After the cement was fully set, the knee was ranged with various thickness of polyethylene trials to achieve full extension and adequate flexion. The knee was inspected for excess cement, which was removed. The real poly, of corresponding thickness was then opened and inserted into the knee. One final range of motion and stability test showed the knee to be in good condition with a well tracking patellar component.    The knee capsule was then closed with a running barbed suture. 2g of tranexamic acid was then injected into the knee joint, and the knee capsule was noted to be water tight. The skin and subcutaneous layer were closed in layers.  A sterile dressing was applied.    The patient was awoken from anesthesia, moved to the [facility] and taken to the recovery room in stable condition. Sponge and needle count were correct. There were no complications. Patient tolerated the procedure well.    R \"Boyd\" Fly REBOLLEDO MD  Orthopaedic Surgery  [facility]  [phone]                    "

## 2021-09-17 NOTE — H&P
Orthopaedic Surgery  History & Physical For Elective Total Knee  Dr. TRISTAN Bill II  [phone]    HPI:  Patient is a 75 y.o. Not  or  female who presents with End-stage arthritis of the left knee. They failed conservative treatment of their knee pain and a thorough discussion of the risks and benefits of surgery was had. The patient wishes to continue with elective total knee replacement, they were scheduled and are here for surgery. They did get medical clearance as well as a thorough preoperative workup.    MEDICAL HISTORY  Past Medical History:   Diagnosis Date   • Arthritis    • Depression    ·   Past Surgical History:   Procedure Laterality Date   • APPENDECTOMY     • PARATHYROIDECTOMY     ·   Prior to Admission medications    Medication Sig Start Date End Date Taking? Authorizing Provider   Cholecalciferol (VITAMIN D3) 5000 units capsule capsule Take 1,000 Units by mouth Daily. HOLD DOS   Yes Augusto Tadeo MD   escitalopram (LEXAPRO) 10 MG tablet Take 1 tablet by mouth Daily. 8/18/21  Yes Shameka Rodriguez APRN   Loratadine (CLARITIN PO) Take  by mouth.   Yes ProviderAugusto MD   Cyanocobalamin (B-12) 3000 MCG sublingual tablet Place  under the tongue.    ProviderAugusto MD   ·   · No Known Allergies  Most Recent Immunizations   Administered Date(s) Administered   • COVID-19 (PFIZER) 03/20/2021   • FLUAD TRI 65YR+ 10/30/2019   • Fluzone High Dose =>65 Years ([employer] ONLY) 10/13/2020   • Influenza Whole 09/03/2009   ·   Social History     Tobacco Use   • Smoking status: Never Smoker   • Smokeless tobacco: Never Used   Substance Use Topics   • Alcohol use: No   ·    Social History     Substance and Sexual Activity   Drug Use No   ·     REVIEW OF SYSTEMS:  · Head: negative for headache  · Respiratory: negative for shortness of breath.   · Cardiovascular: negative for chest pain.   · Gastrointestinal: negative abdominal pain.   · Neurological: negative for  "LOC  · Psychiatric/Behavioral: negative for memory loss.   · All other systems reviewed and are negative    VITALS: Ht 170.2 cm (67\")   Wt 79.4 kg (175 lb)   BMI 27.41 kg/m²  Body mass index is 27.41 kg/m².    PHYSICAL EXAM:   · CONSTITUTIONAL: A&Ox3, No acute distress  · LUNGS: Equal chest rise, no shortness of air  · CARDIOVASCULAR: palpable peripheral pulses  · SKIN: no skin lesions in the area examined  · LYMPH: no lymphadenopathy in the area examined  · EXTREMITY: Knee  · Pulses:  Brisk Capillary Refill  · Sensation: Intact to Saphenous, Sural, Deep Peroneal, Superficial Peroneal, and Tibial Nerves and grossly throughout extremity  · Motor: 5/5 EHL/FHL/TA/GS motor complexes    RADIOLOGY REVIEW:   XR Chest PA & Lateral    Result Date: 9/10/2021  No acute process.  Electronically Signed By-Gagan Fernando MD On:9/10/2021 9:34 AM This report was finalized on [id number] by  Gagan Fernando MD.      LABS:   Results for the past 24 hours: No results found for this or any previous visit (from the past 24 hour(s)).    IMPRESSION:  Patient is a 75 y.o. Not  or  female with end-stage arthritis of the left knee    PLAN:   · Surgery: Elective total knee arthroplasty  · Consent: The risks and benefits of operative versus nonoperative treatment were discussed. The patient elected to undergo operative treatment of their knee arthritis. The risks discussed included but were not limited to blood clots, MI, stroke, other medical complications, infection, damage to neurovascular structures, continued pain, hardware prominence, loss of range of motion, need for further procedures, and and risk of anesthesia..  No guarantees were made   · Disposition: Elective left Total Knee Arthroplasty today.    Francis Bill II, MD  Orthopaedic Surgery  [facility]    "

## 2021-09-17 NOTE — PLAN OF CARE
Goal Outcome Evaluation:  Plan of Care Reviewed With: patient, daughter        Progress: no change  Pt is a 75 y.o. female who presents with end-stage arthritis in L knee. Pt admitted to [facility] for L total knee arthroplasty on 9/17/21. Pt plans to return home with daughter who is able to provide 24/7 care at this time. Pt will be staying on one floor until able to ascend/descend stairs appropriately. Pt was disoriented and lethargic during evaluation today due to lingering anesthesia. Pt’s daughter stated patient has some memory loss and is planning formal evaluation soon. Pt completed TKA protocol exercises with [facility]-PROM d/t lethargy. Pt was mod A for transfer to chair with use of RW and a few ambulatory steps to turn and sit. Family was educated on exercise protocol and given written exercise plan. Pt plans to begin home health after d/c home with 24/7 assist from daughter. Will continue to follow while in [facility].

## 2021-09-17 NOTE — ANESTHESIA POSTPROCEDURE EVALUATION
Patient: Bethany Weaver    Procedure Summary     Date: 09/17/21 Room / Location: [facility] OR  / [facility] MAIN OR    Anesthesia Start: 1131 Anesthesia Stop: 1238    Procedure: TOTAL KNEE ARTHROPLASTY (Left Knee) Diagnosis:       Osteoarthritis of knee      (Osteoarthritis of knee [M17.10])    Surgeons: Francis Bill II, MD Provider: Marquise Cisneros MD    Anesthesia Type: general with block ASA Status: 2          Anesthesia Type: general with block    Vitals  Vitals Value Taken Time   /54 09/17/21 1410   Temp 97 °F (36.1 °C) 09/17/21 1408   Pulse 62 09/17/21 1410   Resp 9 09/17/21 1408   SpO2 98 % 09/17/21 1410   Vitals shown include unvalidated device data.        Post Anesthesia Care and Evaluation    Patient location during evaluation: PACU  Patient participation: complete - patient participated  Level of consciousness: awake  Pain scale: See nurse's notes for pain score.  Pain management: adequate  Airway patency: patent  Anesthetic complications: No anesthetic complications  PONV Status: none  Cardiovascular status: acceptable  Respiratory status: acceptable  Hydration status: acceptable    Comments: Patient seen and examined postoperatively; vital signs stable; SpO2 greater than or equal to 90%; cardiopulmonary status stable; nausea/vomiting adequately controlled; pain adequately controlled; no apparent anesthesia complications; patient discharged from anesthesia care when discharge criteria were met

## 2021-09-18 PROBLEM — E87.5 HYPERKALEMIA: Status: ACTIVE | Noted: 2021-09-18

## 2021-09-18 LAB
ANION GAP SERPL CALCULATED.3IONS-SCNC: 12 MMOL/L (ref 5–15)
BUN SERPL-MCNC: 11 MG/DL (ref 8–23)
BUN/CREAT SERPL: 14.1 (ref 7–25)
CALCIUM SPEC-SCNC: 8.4 MG/DL (ref 8.6–10.5)
CHLORIDE SERPL-SCNC: 105 MMOL/L (ref 98–107)
CO2 SERPL-SCNC: 22 MMOL/L (ref 22–29)
CREAT SERPL-MCNC: 0.78 MG/DL (ref 0.57–1)
DEPRECATED RDW RBC AUTO: 45.1 FL (ref 37–54)
ERYTHROCYTE [DISTWIDTH] IN BLOOD BY AUTOMATED COUNT: 14 % (ref 12.3–15.4)
GFR SERPL CREATININE-BSD FRML MDRD: 72 ML/MIN/1.73
GLUCOSE SERPL-MCNC: 146 MG/DL (ref 65–99)
HCT VFR BLD AUTO: 40.2 % (ref 34–46.6)
HGB BLD-MCNC: 13 G/DL (ref 12–15.9)
MCH RBC QN AUTO: 29.1 PG (ref 26.6–33)
MCHC RBC AUTO-ENTMCNC: 32.3 G/DL (ref 31.5–35.7)
MCV RBC AUTO: 90.2 FL (ref 79–97)
PLATELET # BLD AUTO: 266 10*3/MM3 (ref 140–450)
PMV BLD AUTO: 7 FL (ref 6–12)
POTASSIUM SERPL-SCNC: 4.4 MMOL/L (ref 3.5–5.2)
POTASSIUM SERPL-SCNC: 5.4 MMOL/L (ref 3.5–5.2)
POTASSIUM SERPL-SCNC: 5.6 MMOL/L (ref 3.5–5.2)
RBC # BLD AUTO: 4.46 10*6/MM3 (ref 3.77–5.28)
SODIUM SERPL-SCNC: 139 MMOL/L (ref 136–145)
WBC # BLD AUTO: 11.9 10*3/MM3 (ref 3.4–10.8)

## 2021-09-18 PROCEDURE — 84132 ASSAY OF SERUM POTASSIUM: CPT | Performed by: INTERNAL MEDICINE

## 2021-09-18 PROCEDURE — 63710000001 ONDANSETRON PER 8 MG: Performed by: ORTHOPAEDIC SURGERY

## 2021-09-18 PROCEDURE — 99204 OFFICE O/P NEW MOD 45 MIN: CPT | Performed by: INTERNAL MEDICINE

## 2021-09-18 PROCEDURE — G0378 HOSPITAL OBSERVATION PER HR: HCPCS

## 2021-09-18 PROCEDURE — 85027 COMPLETE CBC AUTOMATED: CPT | Performed by: ORTHOPAEDIC SURGERY

## 2021-09-18 PROCEDURE — 63710000001 PROMETHAZINE PER 12.5 MG: Performed by: ORTHOPAEDIC SURGERY

## 2021-09-18 PROCEDURE — 97530 THERAPEUTIC ACTIVITIES: CPT

## 2021-09-18 PROCEDURE — 84132 ASSAY OF SERUM POTASSIUM: CPT | Performed by: ORTHOPAEDIC SURGERY

## 2021-09-18 PROCEDURE — 97116 GAIT TRAINING THERAPY: CPT

## 2021-09-18 PROCEDURE — 25010000002 CEFAZOLIN PER 500 MG: Performed by: ORTHOPAEDIC SURGERY

## 2021-09-18 PROCEDURE — 97110 THERAPEUTIC EXERCISES: CPT

## 2021-09-18 PROCEDURE — 80048 BASIC METABOLIC PNL TOTAL CA: CPT | Performed by: ORTHOPAEDIC SURGERY

## 2021-09-18 RX ORDER — FAMOTIDINE 20 MG/1
20 TABLET, FILM COATED ORAL
Status: DISCONTINUED | OUTPATIENT
Start: 2021-09-18 | End: 2021-09-22 | Stop reason: HOSPADM

## 2021-09-18 RX ORDER — SODIUM POLYSTYRENE SULFONATE 15 G/60ML
15 SUSPENSION ORAL; RECTAL ONCE
Status: DISCONTINUED | OUTPATIENT
Start: 2021-09-18 | End: 2021-09-22 | Stop reason: HOSPADM

## 2021-09-18 RX ADMIN — ASPIRIN 325 MG: 325 TABLET, COATED ORAL at 08:52

## 2021-09-18 RX ADMIN — ESCITALOPRAM OXALATE 10 MG: 10 TABLET ORAL at 08:52

## 2021-09-18 RX ADMIN — ONDANSETRON HYDROCHLORIDE 4 MG: 4 TABLET, FILM COATED ORAL at 16:01

## 2021-09-18 RX ADMIN — FAMOTIDINE 20 MG: 20 TABLET, FILM COATED ORAL at 19:38

## 2021-09-18 RX ADMIN — OXYCODONE 10 MG: 5 TABLET ORAL at 16:01

## 2021-09-18 RX ADMIN — OXYCODONE 10 MG: 5 TABLET ORAL at 20:23

## 2021-09-18 RX ADMIN — PROMETHAZINE HYDROCHLORIDE 12.5 MG: 12.5 TABLET ORAL at 20:23

## 2021-09-18 RX ADMIN — OXYCODONE 5 MG: 5 TABLET ORAL at 05:42

## 2021-09-18 RX ADMIN — CEFAZOLIN SODIUM 2 G: 10 INJECTION, POWDER, FOR SOLUTION INTRAVENOUS at 04:38

## 2021-09-18 RX ADMIN — OXYCODONE 5 MG: 5 TABLET ORAL at 04:38

## 2021-09-18 RX ADMIN — SODIUM CHLORIDE 100 ML/HR: 9 INJECTION, SOLUTION INTRAVENOUS at 00:21

## 2021-09-18 RX ADMIN — SODIUM CHLORIDE 100 ML/HR: 9 INJECTION, SOLUTION INTRAVENOUS at 10:54

## 2021-09-18 RX ADMIN — MELOXICAM 15 MG: 15 TABLET ORAL at 08:52

## 2021-09-18 NOTE — DISCHARGE PLACEMENT REQUEST
"Bethany Brandon (75 y.o. Female)     Date of Birth Social Security Number Address Home Phone MRN    1945  [id number]  [place] [address] [phone] [id number]    Sabianist Marital Status          Sabianism        Admission Date Admission Type Admitting Provider Attending Provider Department, Room/Bed    9/17/21 Elective Francis Bill II, MD Sweet, Richard Alexander II, MD [facility] SURGICAL INPATIENT, 4112/1    Discharge Date Discharge Disposition Discharge Destination                       Attending Provider: Francis Bill II, MD    Allergies: No Known Allergies    Isolation: None   Infection: MRSA No Isolation this Admit (09/17/21)   Code Status: CPR    Ht: 170.2 cm (67\")   Wt: 89 kg (196 lb 3.4 oz)    Admission Cmt: None   Principal Problem: Essential (primary) hypertension [I10]                 Active Insurance as of 9/17/2021     Primary Coverage     Payor Plan Insurance Group Employer/Plan Group    HUMANA MEDICARE REPLACEMENT HUMANA MEDICARE REPLACEMENT [id number]     Payor Plan Address Payor Plan Phone Number Payor Plan Fax Number Effective Dates    [address] [address] [phone]  1/1/2018 - None Entered    [facility] [id number]       Subscriber Name Subscriber Birth Date Member ID       BETHANY BRANDON 1945 [id number]                 Emergency Contacts      (Rel.) Home Phone Work Phone Mobile Phone    Mona Ca (Daughter) [phone] -- [phone]               History & Physical      Francis Bill II, MD at 09/17/21 0807            Orthopaedic Surgery  History & Physical For Elective Total Knee  Dr. HUDSON “Boyd” Fly REBOLLEDO  [phone]    HPI:  Patient is a 75 y.o. Not  or  female who presents with End-stage arthritis of the left knee. They failed conservative treatment of their knee pain and a thorough discussion of the risks and benefits of surgery was had. The patient wishes to continue with " "elective total knee replacement, they were scheduled and are here for surgery. They did get medical clearance as well as a thorough preoperative workup.    MEDICAL HISTORY  Past Medical History:   Diagnosis Date   • Arthritis    • Depression    ·   Past Surgical History:   Procedure Laterality Date   • APPENDECTOMY     • PARATHYROIDECTOMY     ·   Prior to Admission medications    Medication Sig Start Date End Date Taking? Authorizing Provider   Cholecalciferol (VITAMIN D3) 5000 units capsule capsule Take 1,000 Units by mouth Daily. HOLD DOS   Yes Augusto Tadeo MD   escitalopram (LEXAPRO) 10 MG tablet Take 1 tablet by mouth Daily. 8/18/21  Yes Shameka Rodriguez APRN   Loratadine (CLARITIN PO) Take  by mouth.   Yes ProviderAugusto MD   Cyanocobalamin (B-12) 3000 MCG sublingual tablet Place  under the tongue.    ProviderAugusto MD   ·   · No Known Allergies  Most Recent Immunizations   Administered Date(s) Administered   • COVID-19 (PFIZER) 03/20/2021   • FLUAD TRI 65YR+ 10/30/2019   • Fluzone High Dose =>65 Years ([employer] ONLY) 10/13/2020   • Influenza Whole 09/03/2009   ·   Social History     Tobacco Use   • Smoking status: Never Smoker   • Smokeless tobacco: Never Used   Substance Use Topics   • Alcohol use: No   ·    Social History     Substance and Sexual Activity   Drug Use No   ·     REVIEW OF SYSTEMS:  · Head: negative for headache  · Respiratory: negative for shortness of breath.   · Cardiovascular: negative for chest pain.   · Gastrointestinal: negative abdominal pain.   · Neurological: negative for LOC  · Psychiatric/Behavioral: negative for memory loss.   · All other systems reviewed and are negative    VITALS: Ht 170.2 cm (67\")   Wt 79.4 kg (175 lb)   BMI 27.41 kg/m²  Body mass index is 27.41 kg/m².    PHYSICAL EXAM:   · CONSTITUTIONAL: A&Ox3, No acute distress  · LUNGS: Equal chest rise, no shortness of air  · CARDIOVASCULAR: palpable peripheral pulses  · SKIN: no skin lesions " in the area examined  · LYMPH: no lymphadenopathy in the area examined  · EXTREMITY: Knee  · Pulses:  Brisk Capillary Refill  · Sensation: Intact to Saphenous, Sural, Deep Peroneal, Superficial Peroneal, and Tibial Nerves and grossly throughout extremity  · Motor: 5/5 EHL/FHL/TA/GS motor complexes    RADIOLOGY REVIEW:   XR Chest PA & Lateral    Result Date: 9/10/2021  No acute process.  Electronically Signed By-Gagan Fernando MD On:9/10/2021 9:34 AM This report was finalized on [id number] by  Ggaan Fernando MD.      LABS:   Results for the past 24 hours: No results found for this or any previous visit (from the past 24 hour(s)).    IMPRESSION:  Patient is a 75 y.o. Not  or  female with end-stage arthritis of the left knee    PLAN:   · Surgery: Elective total knee arthroplasty  · Consent: The risks and benefits of operative versus nonoperative treatment were discussed. The patient elected to undergo operative treatment of their knee arthritis. The risks discussed included but were not limited to blood clots, MI, stroke, other medical complications, infection, damage to neurovascular structures, continued pain, hardware prominence, loss of range of motion, need for further procedures, and and risk of anesthesia..  No guarantees were made   · Disposition: Elective left Total Knee Arthroplasty today.    Francis Bill II, MD  Orthopaedic Surgery  [facility]      Electronically signed by Francis Bill II, MD at 09/17/21 1123     H&P signed by New Onbase, [place] at 09/13/21 1111      Scan on 9/14/2021 by New On[employer], [employer]: H&amp;P, LOC, 09/08/2021          Electronically signed by New Onbase, [employer] at 09/13/21 1111          Physical Therapy Notes (last 24 hours) (Notes from 09/17/21 1830 through 09/18/21 1830)      Chioma Ace, PTA at 09/18/21 0900  Version 1 of 1       Subjective: Pt agreeable to therapeutic plan of care. RN stated pt supposed to be seeing MD about  decreased memory soon.     Objective:     Bed mobility - Min-A  Transfers - Min-A and with rolling walker  Ambulation - 20x2 feet Min-A and with rolling walker    Pain: 0 VAS  Education: Provided education on importance of mobility and skilled verbal / tactile cueing throughout intervention.     Assessment: Bethany Weaver presents with functional mobility impairments which indicate the need for skilled intervention. Tolerating session today without incident.  Pt very anxious and difficulty processing commands. On 2.5 L O2. Performed seated LE exs with verbal and tactile cues. ROM 5-95 degrees. Will need 24 hr care and HH to be safe. Will continue to follow and progress as tolerated.     Plan/Recommendations:   Pt would benefit from Home with Home Health and Home with family assist at discharge from facility and requires no DME at discharge.   Pt desires Home with family assist and and Home Health at discharge. Pt cooperative; agreeable to therapeutic recommendations and plan of care.     Basic Mobility 6-click:  Rollin = Total, A lot = 2, A little = 3; 4 = None  Supine>Sit:   1 = Total, A lot = 2, A little = 3; 4 = None   Sit>Stand with arms:  1 = Total, A lot = 2, A little = 3; 4 = None  Bed>Chair:   1 = Total, A lot = 2, A little = 3; 4 = None  Ambulate in room:  1 = Total, A lot = 2, A little = 3; 4 = None  3-5 Steps with railin = Total, A lot = 2, A little = 3; 4 = None  Score: 16    Modified Payne: 4 = Moderately severe disability (Unable to attend to own bodily needs without assistance, and unable to walk unassisted)     Post-Tx Position: Up in Chair, Alarms activated and Call light and personal items within reach  PPE: gloves, surgical mask, eyewear protection    Electronically signed by Chioma Ace PTA at 21 1256     Chioma Ace PTA at 21 0900  Version 1 of 1        Assessment: Bethany Weaver presents with functional mobility impairments which indicate the  need for skilled intervention. Tolerating session today without incident.  Pt very anxious and difficulty processing commands. On 2.5 L O2. Performed seated LE exs with verbal and tactile cues. ROM 5-95 degrees. Will need 24 hr care and HH to be safe. Will continue to follow and progress as tolerated.           Electronically signed by Chioma Ace PTA at 09/18/21 1256     Chioma Ace PTA at 09/18/21 1405  Version 1 of 1       Subjective: Pt agreeable to therapeutic plan of care.     Objective:     Bed mobility - N/A or Not attempted.  Transfers - CGA and with rolling walker with vc's for proper hand placement  Ambulation - 80x2 feet CGA and with rolling walker with vc's for directions    Pain: c/o pain in L knee but did not rate    Education: Provided education on importance of mobility and skilled verbal / tactile cueing throughout intervention.     Assessment: Bethany Weaver presents with functional mobility impairments which indicate the need for skilled intervention. Tolerating session today without incident. Pt still needs a lot of cues for proper follow thru. Performed seated LE exs x 10 with vc's to incr end ranges.Very forgeful and will need 24 hr care at dc and HH to f/u.  Will continue to follow and progress as tolerated.     Plan/Recommendations:   Pt would benefit from Home with family assist and and Home Health at discharge from facility and requires no DME at discharge.   Pt desires Home with family assist and and Home Health at discharge. Pt cooperative; agreeable to therapeutic recommendations and plan of care.     Post-Tx Position: Up in Chair, Alarms activated and Call light and personal items within reach  PPE: gloves, surgical mask, eyewear protection    Electronically signed by Chioma Ace PTA at 09/18/21 1618     Chioma Ace PTA at 09/18/21 1401  Version 1 of 1        Assessment: Bethany Weaver presents with functional mobility impairments which indicate the  need for skilled intervention. Tolerating session today without incident. Pt still needs a lot of cues for proper follow thru. Performed seated LE exs x 10 with vc's to incr end ranges.Very forgeful and will need 24 hr care at ID and HH to f/u.  Will continue to follow and progress as tolerated.                 Electronically signed by Chioma Ace PTA at 21 1618       [facility] SURGICAL INPATIENT  [address] [place] [address]  Phone:  [phone]  Fax:  [phone] Date: Sep 18, 2021      Ambulatory Referral to Home Health     Patient:  Bethany Weaver MRN:  [id number]   [address] [place] [address] :  1945  SSN:    Phone: [phone] Sex:  F      INSURANCE PAYOR PLAN GROUP # SUBSCRIBER ID   Primary:    HUMANA MEDICARE REPLACEMENT [id number]      Referring Provider Information:  EMIGDIO MARADIAGA II Phone: [phone] Fax: [phone]      Referral Information:   # Visits:  999 Referral Type: Home Health [42]   Urgency:  Routine Referral Reason: Specialty Services Required   Start Date: Sep 18, 2021 End Date:  To be determined by Insurer   Diagnosis: Status post total knee replacement, unspecified laterality (Z96.659 [ICD-10-CM] V43.65 [ICD-9-CM])      Refer to Dept:   Refer to Provider:   Refer to Facility:       Face to Face Visit Date: 2021  Follow-up provider for Plan of Care? I will be treating the patient on an ongoing basis.  Please send me the Plan of Care for signature.  Follow-up provider: EMIGDIO MARADIAGA II [[id number]]  Reason/Clinical Findings: post hospital eval  Describe mobility limitations that make leaving home difficult: impaired mobility  Nursing/Therapeutic Services Requested: Physical Therapy (hhc to eval)  Nursing/Therapeutic Services Requested: Other  PT orders: Total joint pathway  Frequency: 1 Week 1     This document serves as a request of services and does not constitute Insurance  authorization or approval of services.  To determine eligibility, please contact the members Insurance carrier to verify and review coverage.     If you have medical questions regarding this request for services. Please contact [facility] SURGICAL INPATIENT at [phone] during normal business hours.       Verbal Order Mode: Telephone with readback   Authorizing Provider: Francis Bill II, MD  Authorizing Provider's NPI: [id number]     Order Entered By: Gisella Vieira RN 9/18/2021  6:29 PM

## 2021-09-18 NOTE — CONSULTS
[facility] Medicine Services - Consult Note    Patient Name: Bethany Weaver  : 1945  MRN: [id number]  Primary Care Physician:  Shameka Rodriguez APRN  Referring Physician: Francis Bill*  Date of admission: 2021    Inpatient Hospitalist Consult  Consult performed by: Stewart Dave MD  Consult ordered by: Francis Bill II, MD  Reason for consult: Hypertension, hyperlipidemia, hyperkalemia  Assessment/Recommendations: Continue home regimen  Kayexalate x1 dose    Thank you for the consult          Subjective      Reason for Consult/ Chief Complaint: Postop elective TKR left    History of Present Illness: Bethany Weaver is a 75 y.o. female patient is a 75-year-old pleasant white female who is now postop elective knee surgery left.  Denies any active symptomatology except for surgical related.    Review of Systems   Constitutional: Negative.   HENT: Negative.    Eyes: Negative.    Cardiovascular: Negative.    Respiratory: Negative.    Endocrine: Negative.    Hematologic/Lymphatic: Negative.    Skin: Negative.    Musculoskeletal: Negative.    Gastrointestinal: Positive for heartburn.   Genitourinary: Negative.    Neurological: Negative.    Psychiatric/Behavioral: Negative.    Allergic/Immunologic: Negative.    All other systems reviewed and are negative.       Personal History     Past Medical History:   Diagnosis Date   • Arthritis    • Depression        Past Surgical History:   Procedure Laterality Date   • APPENDECTOMY     • PARATHYROIDECTOMY     • TOTAL KNEE ARTHROPLASTY Right        Family History: family history includes Cancer in her sister; Hypertension in her sister; Paget's disease of bone in her brother; Stroke in her mother. Otherwise pertinent FHx was reviewed and not pertinent to current issue.    Social History:  reports that she has never smoked. She has never used smokeless tobacco. She reports that she does not drink alcohol and  does not use drugs.    Home Medications:   B-12, Loratadine, escitalopram, and vitamin D3    Allergies:  No Known Allergies      Objective      Vitals:  Temp:  [97.6 °F (36.4 °C)-98 °F (36.7 °C)] 98 °F (36.7 °C)  Heart Rate:  [60-71] 60  Resp:  [15-18] 18  BP: (115-153)/(61-83) 115/67  Flow (L/min):  [2.5-4] 2.5    Physical Exam  Vitals and nursing note reviewed.   Constitutional:       General: She is not in acute distress.     Appearance: Normal appearance. She is well-developed. She is not ill-appearing, toxic-appearing or diaphoretic.   HENT:      Head: Normocephalic and atraumatic.      Right Ear: Ear canal and external ear normal.      Left Ear: Ear canal and external ear normal.      Nose: Nose normal. No congestion or rhinorrhea.      Mouth/Throat:      Mouth: Mucous membranes are moist.      Pharynx: No oropharyngeal exudate.   Eyes:      General: No scleral icterus.        Right eye: No discharge.         Left eye: No discharge.      Extraocular Movements: Extraocular movements intact.      Conjunctiva/sclera: Conjunctivae normal.      Pupils: Pupils are equal, round, and reactive to light.   Neck:      Thyroid: No thyromegaly.      Vascular: No carotid bruit or JVD.      Trachea: No tracheal deviation.   Cardiovascular:      Rate and Rhythm: Normal rate and regular rhythm.      Pulses: Normal pulses.      Heart sounds: Normal heart sounds. No murmur heard.   No friction rub. No gallop.    Pulmonary:      Effort: Pulmonary effort is normal. No respiratory distress.      Breath sounds: Normal breath sounds. No stridor. No wheezing, rhonchi or rales.   Chest:      Chest wall: No tenderness.   Abdominal:      General: Bowel sounds are normal. There is no distension.      Palpations: Abdomen is soft. There is no mass.      Tenderness: There is no abdominal tenderness. There is no guarding or rebound.      Hernia: No hernia is present.   Musculoskeletal:         General: No swelling, tenderness, deformity or  signs of injury. Normal range of motion.      Cervical back: Normal range of motion and neck supple. No rigidity. No muscular tenderness.      Right lower leg: No edema.      Left lower leg: No edema.      Comments: Left knee postop pain and in cooling device   Lymphadenopathy:      Cervical: No cervical adenopathy.   Skin:     General: Skin is warm and dry.      Coloration: Skin is not jaundiced or pale.      Findings: No bruising, erythema or rash.   Neurological:      General: No focal deficit present.      Mental Status: She is alert and oriented to person, place, and time. Mental status is at baseline.      Cranial Nerves: No cranial nerve deficit.      Sensory: No sensory deficit.      Motor: No weakness or abnormal muscle tone.      Coordination: Coordination normal.   Psychiatric:         Mood and Affect: Mood normal.         Behavior: Behavior normal.         Thought Content: Thought content normal.         Judgment: Judgment normal.          Result Review    Result Review:  I have personally reviewed the results from the time of this admission to 9/18/2021 18:05 EDT and agree with these findings:  [x]  Laboratory  [x]  Microbiology  [x]  Radiology  [x]  EKG/Telemetry   []  Cardiology/Vascular   []  Pathology  []  Old records  []  Other:  Most notable findings include: Hyperkalemia with normal renal function 09/18/21, 6:07 PM EDT        Assessment/Plan        Active Hospital Problems:  Active Hospital Problems    Diagnosis    • **Essential (primary) hypertension    • Hyperkalemia    • Total knee replacement status    • Mixed hyperlipidemia      Plan:     Hypertension:  Continue home medications   Options include-  beta-blockers  Calcium channel blockers  ACE inhibitor  Vasodilators  Low-dose diuretics  PRN medications have not been shown to affect outcomes-to be avoided        Hyperlipidemia:  Check lipid panel  Statins if indicated  Add Ezetimibe if appropriate  Only Icosapent Ethyl (omega-3) demonstrated  efficacy in Hypertriglyceridemia. (STRENGTH, REDUCE IT trials)    Hyperkalemia  Possibly due to meloxicam use and operative tissue trauma.  Assuming no additive potassium administered in IV fluids.  Kayexalate x1 dose    This patient has been examined wearing appropriate Personal Protective Equipment . 09/18/21      Signature: Stewart Dave MD, FACP

## 2021-09-18 NOTE — THERAPY TREATMENT NOTE
Subjective: Pt agreeable to therapeutic plan of care. RN stated pt supposed to be seeing MD about decreased memory soon.     Objective:     Bed mobility - Min-A  Transfers - Min-A and with rolling walker  Ambulation - 20x2 feet Min-A and with rolling walker    Pain: 0 VAS  Education: Provided education on importance of mobility and skilled verbal / tactile cueing throughout intervention.     Assessment: Bethany Weaver presents with functional mobility impairments which indicate the need for skilled intervention. Tolerating session today without incident.  Pt very anxious and difficulty processing commands. On 2.5 L O2. Performed seated LE exs with verbal and tactile cues. ROM 5-95 degrees. Will need 24 hr care and HH to be safe. Will continue to follow and progress as tolerated.     Plan/Recommendations:   Pt would benefit from Home with Home Health and Home with family assist at discharge from facility and requires no DME at discharge.   Pt desires Home with family assist and and Home Health at discharge. Pt cooperative; agreeable to therapeutic recommendations and plan of care.     Basic Mobility 6-click:  Rollin = Total, A lot = 2, A little = 3; 4 = None  Supine>Sit:   1 = Total, A lot = 2, A little = 3; 4 = None   Sit>Stand with arms:  1 = Total, A lot = 2, A little = 3; 4 = None  Bed>Chair:   1 = Total, A lot = 2, A little = 3; 4 = None  Ambulate in room:  1 = Total, A lot = 2, A little = 3; 4 = None  3-5 Steps with railin = Total, A lot = 2, A little = 3; 4 = None  Score: 16    Modified Karthik: 4 = Moderately severe disability (Unable to attend to own bodily needs without assistance, and unable to walk unassisted)     Post-Tx Position: Up in Chair, Alarms activated and Call light and personal items within reach  PPE: gloves, surgical mask, eyewear protection

## 2021-09-18 NOTE — PLAN OF CARE
Goal Outcome Evaluation:           Progress: no change  Outcome Summary: Hospitalist consulted for elevated potassium. Plan for patient to discharge tomorrow if potassium better and patient progresses with Physical therapy

## 2021-09-18 NOTE — THERAPY TREATMENT NOTE
Subjective: Pt agreeable to therapeutic plan of care.     Objective:     Bed mobility - N/A or Not attempted.  Transfers - CGA and with rolling walker with vc's for proper hand placement  Ambulation - 80x2 feet CGA and with rolling walker with vc's for directions    Pain: c/o pain in L knee but did not rate    Education: Provided education on importance of mobility and skilled verbal / tactile cueing throughout intervention.     Assessment: Bethany Weaver presents with functional mobility impairments which indicate the need for skilled intervention. Tolerating session today without incident. Pt still needs a lot of cues for proper follow thru. Performed seated LE exs x 10 with vc's to incr end ranges.Very forgeful and will need 24 hr care at MS and HH to f/u.  Will continue to follow and progress as tolerated.     Plan/Recommendations:   Pt would benefit from Home with family assist and and Home Health at discharge from facility and requires no DME at discharge.   Pt desires Home with family assist and and Home Health at discharge. Pt cooperative; agreeable to therapeutic recommendations and plan of care.     Post-Tx Position: Up in Chair, Alarms activated and Call light and personal items within reach  PPE: gloves, surgical mask, eyewear protection

## 2021-09-18 NOTE — PLAN OF CARE
Goal Outcome Evaluation:    Rested well through the night. Patient is starting to feel some pain on her leg, med was given. Neurovascular checks are intact. Reeducated patient about taking pain pill even if it's mild.

## 2021-09-18 NOTE — PROGRESS NOTES
Orthopaedic Surgery   Daily Progress Note  Dr. TRISTAN Bill II  [phone]  DEMOGRAPHICS:   · Bethany Weaver   · Age:75 y.o.   · MRN:[id number]  · Admitted: 9/17/2021    PROCEDURE: 1 Day Post-Op s/p Procedure(s):  TOTAL KNEE ARTHROPLASTY     HOSPITAL PROGRESS  · Patient Issues: Orthostatic hypotension and nausea yesterday have limited patient's mobility so far  · Ambulation/Activity: Minimal activity since surgery     VITALS:  Vitals:    09/17/21 2100 09/18/21 0024 09/18/21 0436 09/18/21 0500   BP: 153/83 133/76 123/61    BP Location:  Left arm Left arm    Patient Position:  Lying Lying    Pulse: 63 70 65    Resp: 16 15 15    Temp: 97.6 °F (36.4 °C) 97.6 °F (36.4 °C) 97.8 °F (36.6 °C)    TempSrc: Oral Oral Oral    SpO2:  99% 96%    Weight:    89 kg (196 lb 3.4 oz)   Height:           PHYSICAL EXAM:  · LUNGS: Equal chest rise, no shortness of air  · CARDIOVASCULAR: brisk capillary refill intact  · WOUND: SHEA Wound Vac System working in good condition, minimal drainage  · EXTREMITY: Operative Leg  · Pulses: brisk capillary refill intact  · Sensation: Sensation intact to light touch to the saphenous/sural/tibial/deep peroneal/superficial peroneal nerves, and grossly throughout the extremity.  · Motor: 5/5 EHL/FHL/TA/GS motor complexes    LABS:   Lab Results   Component Value Date    HGB 13.0 09/18/2021     Lab Results   Component Value Date    WBC 11.90 (H) 09/18/2021     Lab Results   Component Value Date    GLUCOSE 146 (H) 09/18/2021    CALCIUM 8.4 (L) 09/18/2021     09/18/2021    K 5.6 (H) 09/18/2021    CO2 22.0 09/18/2021     09/18/2021    BUN 11 09/18/2021    CREATININE 0.78 09/18/2021    EGFRIFAFRI 91 06/08/2020    EGFRIFNONA 72 09/18/2021    BCR 14.1 09/18/2021    ANIONGAP 12.0 09/18/2021       ASSESSMENT: Patient is a 75 y.o. female who is 1 Day Post-Op s/p Procedure(s):  TOTAL KNEE ARTHROPLASTY     PLAN:   · Weight Bearing: Weight Bearing As Tolerated  · Labs: Above lab values review.  "Plan: Hospitalist consult for management of electrolye imbalances.   · PT/OT: To Mobilize  · DVT PPX: ASA 325mg Daily for 30 days  · Post-Op Xray: TKA implants in good alignment.   · Follow-Up: In office at 3 weeks postop  · Dispo: Hopefully home discharge tomorrow if potassium can be corrected and patient's orthostatic hypotension improves.  She must do better with therapy    R \"Boyd\" Fly REBOLLEDO MD  Orthopaedic Surgery  [facility]  [phone] - [place] Office  [phone] - [place] Office      "

## 2021-09-18 NOTE — PLAN OF CARE
Assessment: Bethany Weaver presents with functional mobility impairments which indicate the need for skilled intervention. Tolerating session today without incident. Pt still needs a lot of cues for proper follow thru. Performed seated LE exs x 10 with vc's to incr end ranges.Very forgeful and will need 24 hr care at dc and HH to f/u.  Will continue to follow and progress as tolerated.

## 2021-09-18 NOTE — PLAN OF CARE
Assessment: Bethany Weaver presents with functional mobility impairments which indicate the need for skilled intervention. Tolerating session today without incident.  Pt very anxious and difficulty processing commands. On 2.5 L O2. Performed seated LE exs with verbal and tactile cues. ROM 5-95 degrees. Will need 24 hr care and HH to be safe. Will continue to follow and progress as tolerated.

## 2021-09-18 NOTE — NURSING NOTE
Attempted to sit patient in the chair, but got dizzy. Laid patient back in bed, Stated she feels better. Will continue to monitor.

## 2021-09-18 NOTE — PROGRESS NOTES
Discharge Planning Assessment   [facility]     Patient Name: Bethany Weaver  MRN: [id number]  Today's Date: 9/18/2021    Admit Date: 9/17/2021      Discharge Plan     Row Name 09/18/21 1827       Plan    Plan  DC plan: anticipate home with [facility]. Arrange by Dr. Bill's office    Plan Comments  [facility] arranged by Dr. Bill's office prior to admission. Orders reviewed. Will need to notify of discharge.        Continued Care and Services - Admitted Since 9/17/2021     Home Medical Care     Service Provider Request Status Selected Services Address Phone Fax Patient Preferred    [facility] [place]  Pending - Request Sent N/A [id number] [phone] --    [facility]  Pending - Request Sent N/A [address] [facility] [address] [facility] [phone] --                  Gisella Vieira RN

## 2021-09-19 ENCOUNTER — APPOINTMENT (OUTPATIENT)
Dept: GENERAL RADIOLOGY | Facility: HOSPITAL | Age: 76
End: 2021-09-19

## 2021-09-19 PROBLEM — R41.0 DELIRIUM WITH DEMENTIA: Status: ACTIVE | Noted: 2021-09-19

## 2021-09-19 PROBLEM — F05 DELIRIUM WITH DEMENTIA: Status: ACTIVE | Noted: 2021-09-19

## 2021-09-19 PROBLEM — F03.90 DELIRIUM WITH DEMENTIA: Status: ACTIVE | Noted: 2021-09-19

## 2021-09-19 PROCEDURE — 97530 THERAPEUTIC ACTIVITIES: CPT

## 2021-09-19 PROCEDURE — 63710000001 ONDANSETRON PER 8 MG: Performed by: ORTHOPAEDIC SURGERY

## 2021-09-19 PROCEDURE — 73560 X-RAY EXAM OF KNEE 1 OR 2: CPT

## 2021-09-19 PROCEDURE — 99233 SBSQ HOSP IP/OBS HIGH 50: CPT | Performed by: INTERNAL MEDICINE

## 2021-09-19 PROCEDURE — 25010000002 HYDROMORPHONE PER 4 MG: Performed by: ORTHOPAEDIC SURGERY

## 2021-09-19 PROCEDURE — 97110 THERAPEUTIC EXERCISES: CPT

## 2021-09-19 RX ORDER — ASPIRIN 325 MG
325 TABLET, DELAYED RELEASE (ENTERIC COATED) ORAL DAILY
Qty: 30 TABLET | Refills: 0 | Status: SHIPPED | OUTPATIENT
Start: 2021-09-20 | End: 2021-10-28 | Stop reason: SDUPTHER

## 2021-09-19 RX ORDER — CLONIDINE HYDROCHLORIDE 0.1 MG/1
0.1 TABLET ORAL NIGHTLY
Status: COMPLETED | OUTPATIENT
Start: 2021-09-19 | End: 2021-09-21

## 2021-09-19 RX ORDER — CHOLECALCIFEROL (VITAMIN D3) 125 MCG
20 CAPSULE ORAL NIGHTLY
Status: DISCONTINUED | OUTPATIENT
Start: 2021-09-19 | End: 2021-09-22 | Stop reason: HOSPADM

## 2021-09-19 RX ORDER — ACETAMINOPHEN 325 MG/1
650 TABLET ORAL EVERY 4 HOURS PRN
Status: DISCONTINUED | OUTPATIENT
Start: 2021-09-19 | End: 2021-09-22 | Stop reason: HOSPADM

## 2021-09-19 RX ORDER — OXYCODONE HYDROCHLORIDE AND ACETAMINOPHEN 5; 325 MG/1; MG/1
1 TABLET ORAL EVERY 4 HOURS PRN
Qty: 50 TABLET | Refills: 0 | Status: SHIPPED | OUTPATIENT
Start: 2021-09-19 | End: 2023-02-08

## 2021-09-19 RX ORDER — LIDOCAINE 50 MG/G
1 PATCH TOPICAL
Status: DISCONTINUED | OUTPATIENT
Start: 2021-09-19 | End: 2021-09-22 | Stop reason: HOSPADM

## 2021-09-19 RX ORDER — RISPERIDONE 0.5 MG/1
0.25 TABLET, ORALLY DISINTEGRATING ORAL NIGHTLY
Status: COMPLETED | OUTPATIENT
Start: 2021-09-19 | End: 2021-09-21

## 2021-09-19 RX ORDER — CALCIUM CARBONATE 200(500)MG
2 TABLET,CHEWABLE ORAL 3 TIMES DAILY PRN
Status: DISCONTINUED | OUTPATIENT
Start: 2021-09-19 | End: 2021-09-22 | Stop reason: HOSPADM

## 2021-09-19 RX ADMIN — CLONIDINE HYDROCHLORIDE 0.1 MG: 0.1 TABLET ORAL at 19:56

## 2021-09-19 RX ADMIN — HYDROMORPHONE HYDROCHLORIDE 1 MG: 2 INJECTION, SOLUTION INTRAMUSCULAR; INTRAVENOUS; SUBCUTANEOUS at 08:11

## 2021-09-19 RX ADMIN — Medication 20 MG: at 19:55

## 2021-09-19 RX ADMIN — OXYCODONE 10 MG: 5 TABLET ORAL at 09:52

## 2021-09-19 RX ADMIN — ONDANSETRON HYDROCHLORIDE 4 MG: 4 TABLET, FILM COATED ORAL at 16:47

## 2021-09-19 RX ADMIN — CALCIUM CARBONATE 2 TABLET: 500 TABLET, CHEWABLE ORAL at 16:47

## 2021-09-19 RX ADMIN — ESCITALOPRAM OXALATE 10 MG: 10 TABLET ORAL at 08:06

## 2021-09-19 RX ADMIN — LIDOCAINE 1 PATCH: 50 PATCH TOPICAL at 09:52

## 2021-09-19 RX ADMIN — ASPIRIN 325 MG: 325 TABLET, COATED ORAL at 08:06

## 2021-09-19 RX ADMIN — OXYCODONE 10 MG: 5 TABLET ORAL at 05:27

## 2021-09-19 RX ADMIN — FAMOTIDINE 20 MG: 20 TABLET, FILM COATED ORAL at 08:06

## 2021-09-19 RX ADMIN — RISPERIDONE 0.25 MG: 0.5 TABLET, ORALLY DISINTEGRATING ORAL at 19:55

## 2021-09-19 RX ADMIN — ACETAMINOPHEN 650 MG: 325 TABLET, FILM COATED ORAL at 05:40

## 2021-09-19 RX ADMIN — OXYCODONE 10 MG: 5 TABLET ORAL at 23:48

## 2021-09-19 RX ADMIN — HYDROMORPHONE HYDROCHLORIDE 1 MG: 2 INJECTION, SOLUTION INTRAMUSCULAR; INTRAVENOUS; SUBCUTANEOUS at 00:23

## 2021-09-19 RX ADMIN — MELOXICAM 15 MG: 15 TABLET ORAL at 08:06

## 2021-09-19 RX ADMIN — OXYCODONE 10 MG: 5 TABLET ORAL at 01:14

## 2021-09-19 RX ADMIN — OXYCODONE 10 MG: 5 TABLET ORAL at 16:47

## 2021-09-19 RX ADMIN — FAMOTIDINE 20 MG: 20 TABLET, FILM COATED ORAL at 16:47

## 2021-09-19 NOTE — PLAN OF CARE
Goal Outcome Evaluation:  Patient very confused at times, has taken apart zenia dressing off on knee and refuses to wear ice packs, very painful, given prn medications, however patient states medication not effective, did ambulate short distance with staff and walker, but was yelling out in pain at times

## 2021-09-19 NOTE — PLAN OF CARE
Assessment: Bethany Weaver presents with functional mobility impairments which indicate the need for skilled intervention. Still not tolerating much in pm session. Was unable to get her to come to full stance to walk today. Nsg was able to get her on/off bs commode but she screamed out the entire time. Family leaning more towards rehab at [facility] now.Will continue to follow and progress as tolerated.

## 2021-09-19 NOTE — PROGRESS NOTES
Discharge Planning Assessment  [facility]     Patient Name: Bethany Weaver  MRN: [id number]  Today's Date: 9/19/2021    Admit Date: 9/17/2021    Discharge Plan     Row Name 09/19/21 1851       Plan    Plan  DC plan: IP rehab recommending. Family agreeable. Awaiting choices.    Provided Post Acute Provider List?  Yes    Post Acute Provider List  Inpatient Rehab    Delivered To  Support Person    Support Person  Sister- Leann    Method of Delivery  In person    Plan Comments  PT now recommending IP rehab. Pt confused. S/W pt's sister at bedside. Pt and family live in [place]. IP rehab and reviews from medicare.gov provided. Awaiting choices.        Continued Care and Services - Admitted Since 9/17/2021     Home Medical Care     Service Provider Request Status Selected Services Address Phone Fax Patient Preferred    [facility] [place]  Accepted N/A [phone] [phone] --    [facility]  Accepted N/A [address] [facility] [phone] --              Expected Discharge Date and Time     Expected Discharge Date Expected Discharge Time    Sep 19, 2021           Gisella Vieira RN

## 2021-09-19 NOTE — THERAPY TREATMENT NOTE
Subjective: Pt agreeable to therapeutic plan of care. Patient still very lethargic and confused. Keeps taking out O2 and sats low at times. Very figidty and difficulty keeping her eyes open at times. Talked with dtr she lives with and she was on her way up and stated she wanted to see how she was in morning and agreed to maybe go to rehab.     Objective:     Bed mobility - N/A or Not attempted.  Transfers - Dependent was unable to get her to come to full stance, just pulled arms away from her.  Ambulation -feet N/A or Not attempted.    Pain: still a lot of pain L knee but pt is more confused  Education: Provided education on importance of mobility and skilled verbal / tactile cueing throughout intervention.     Assessment: Bethany Weaver presents with functional mobility impairments which indicate the need for skilled intervention. Still not tolerating much in pm session. Was unable to get her to come to full stance to walk today. Nsg was able to get her on/off bs commode but she screamed out the entire time. Family leaning more towards rehab at [facility] now.Will continue to follow and progress as tolerated.     Plan/Recommendations:   Pt would benefit from Inpatient Rehabilitation placement at discharge from facility and requires no DME at discharge.   Pt desires Inpatient Rehabilitation placement at discharge. Pt cooperative; agreeable to therapeutic recommendations and plan of care.       Post-Tx Position: Up in Chair, Alarms activated and Call light and personal items within reach  PPE: gloves, surgical mask, eyewear protection

## 2021-09-19 NOTE — DISCHARGE INSTRUCTIONS
Total Knee  Discharge Instructions  Dr. TRISTAN Agustin” [place] II  [phone]    • INCISION CARE  o Wash your hands prior to dressing changes  o SHEA Wound VAC: Postoperatively you had a SHEA Wound Vac placed on the incision. This was placed under sterile conditions in the operating room. It remains in place for 7 days postoperatively. After 7 days, the entire dressing must be removed, including all of the sticky adhesive. The dressing and battery pack provide gentle suction to the incision and provide several benefits over a traditional dressing:  - It maintains the sterile environment of the OR and reduces the risk of infection  - The suction removes unwanted buildup of blood/hematoma under the skin to reduce swelling  - The suction also promotes fresh blood supply to the skin and soft tissue to speed up healing  - The postoperative scar is reduced in size  - Showering is permitted immediately after surgery, but the battery pack must be protected or removed during the shower.   o After 7 days the SHEA Wound Vac is removed. If there is no drainage, no dressing is required. If there is some scant drainage a dry bandage can be applied and changed daily until seen in the office or until the drainage stops.   o No creams or ointments to the incisions until 4 weeks post op.  o Do not touch or pick at the incision  o Check incision every day and notify surgeon immediately if any of the following signs or symptoms are seen:  - Increase in redness  - Increase in swelling around the incision and of the entire extremity  - Increase in pain  - NEW drainage or oozing from the incision  - Pulling apart of the edges of the incision  - Increase in overall body temperature (greater than 100.4 degrees)  o Zip-Line: your incision was closed with a state of the art device.   - Is a non-invasive and easy to use wound closure device that replaces sutures, staples and glue for surgical incisions  - It minimizes scarring and eliminating  “railroad” marks that come with staples or sutures  - It makes removal as atraumatic as peeling off a bandage  - Can be removed at home or by a physical therapist or nurse at 14 days postoperatively    • ACTIVITIES  o Exercises:  - Physical therapy will begin immediately while in the hospital. Patients going to a nursing home will get therapy as part of their care at the SNU/SNF facility. Patients going home may also have a therapist come to the house to help them mobilize until they can safely get to an outpatient therapy facility.  - Elevate the affected leg most of the day during the first week post operatively. Caution must be taken to avoid pillow placement directly under the heel of the leg, as this can cause pressure ulcers even with a soft pillow. All pillows and blankets should be placed underneath of the thigh and calf so that the heel is free-floating.  - Use cold packs for 20-30 minutes approximately 5 times per day.  - You should perform the daily stretching and strengthening exercises as taught by the therapist as often as possible. This can be done many times a day.  - Full weight bearing is allowed after surgery. It will be sore/painful to put weight on the leg, but this will help the bone to heal and prevent complications such as pneumonia, bed sores and blood clots. Mobilization is vital to the recovery process.  o Activities of Daily Living:  - No tub baths, hot tubs, or swimming pools for 4 weeks.  - May shower and let water run over the incision immediately after surgery. The battery pack of the SHEA Wound Vac must be protected or removed while in the shower. After the SHEA is removed 7 days after surgery showering is permitted as long as there is no drainage from the wound.     • Restrictions  o Weight: It is ok to allow full weight bearing after surgery. Weight on the leg actually quickens the recovery process. While it will be sore/painful to put weight on the leg, it is safe to do so. Hip  replacement after hip fracture has a much slower recover process. It can take months to heal fully from a hip fracture and patients even make some slow benefits up to a year afterwards.   o Driving: Many patients have questions about when it is safe to return to driving. The answer is that this is extremely variable. It depends on the extent of the surgery, as well as how quickly you heal. Certainly left leg surgeries make returning to driving easier while right leg surgeries require more extensive rehabilitation before driving can be safe. Until you can press down on the brake hard, and are off of all narcotics, driving is not permitted. Your surgeon cannot “clear” you to return to driving, only you can make the decision when you feel it is safe.    • Medications  o Anticoagulants: After upper extremity surgery most patients do not require an anticoagulant unless you have another injury that will be keeping you from mobilizing. Lower extremity surgery typically does require use of an anticoagulation medicine.   - IF YOU HAD LOWER EXTREMITY SURGERY AND ARE NOT DISCHARGED HOME WITH ANY ANTICOAGULANT MEDICINE YOU SHOULD TAKE ASPIRIN 325mg DAILY FOR 30 DAYS POSTOPERATIVELY.  - If you are discharged home with an anticoagulant such as Aspirin, Xarelto, Eliquis, Coumadin, or Lovenox, follow these simple instructions:   - Notify surgeon immediately if any pearl bleeding is noted in the urine, stool, emesis, or from the nose or the incision. Blood in the stool will often appear as black rather than red. Blood in urine may appear as pink. Blood in emesis may appear as brown/black like coffee grounds.  - You will need to apply pressure for longer periods of time to any cuts or abrasions to stop bleeding  - Avoid alcohol while taking anticoagulants  - Most anticoagulants are to be taken for 30 days postoperatively. After this time, you may stop using them unless instructed otherwise.   - If you were already taking an  anticoagulant (commonly Aspirin, Coumadin, or Plavix) you will likely be resuming your normal dose postoperatively and will be continuing that medication at the discretion of the prescribing physician.  o Stool Softeners: You will be at greater risk of constipation after surgery due to being less mobile and the pain medications.  - Take stool softeners as needed. Over the counter Colace 100 mg 1-2 capsules twice daily can be taken.  - If stools become too loose or too frequent, please decreases the dosage or stop the stool softener.  - If constipation occurs despite use of stool softeners, you are to continue the stool softeners and add a laxative (Milk of Magnesia 1 ounce daily as needed)  - Drink plenty of fluids, and eat fruits and vegetables during your recovery time. Getting up and mobilizing will help the bowels to recover their regular function, as will weaning off of all narcotics when the pain becomes tolerable.  o Pain Medications: Utilized after surgery are narcotics. This is some general information about these medications.  - CLASSIFICATION: Pain medications are called Opioids and are narcotics  - LEGALITIES: It is illegal to share narcotics with others  - DRIVING: it is illegal to drive while under the influence of narcotics. Doing so is a DUI.  - POTENTIAL SIDE EFFECTS: nausea, vomiting, itching, dizziness, drowsiness, dry mouth, constipation, and difficulty urinating.  - POTENTIAL ADVERSE EFFECTS:  - Opioid tolerance can develop with use of pain medications and this simply means that it requires more and more of the medication to control pain. However, this is seen more in patients that use opioids for longer periods of time.  - Opioid dependence can develop with use of Opioids. People with opioid dependence will experience withdrawal symptoms upon cessation of the medication.  - Opioid addiction can develop with use of Opioids. The incidence of this is very unlikely in patients who take the  medications as ordered and stop the medications as instructed.  - Opioid overdose can be dangerous, but is unlikely when the medication is taken as ordered and stopped when ordered. It is important not to mix opioids with alcohol as this can lead to over sedation and respiratory difficulty.  - DOSAGE:  - After the initial surgical pain begins to resolve, you may begin to decrease the pain medication. By the end of a few weeks, you should be off of pain medications.  - Refills will not be given by the office during evening hours, on weekends, or after 6 weeks post-op. You are responsible for weaning off of pain medication. You can increase the time between narcotic pills, taking one every 4 then 6 then 8 hours and so on.  - To seek refills on pain medications during the initial 6-week post-operative period, you must call the office to request the refill. The office will then notify you when to  the prescription. DO NOT wait until you are out of the medication to request a refill. Prescriptions will not be filled over the weekend and depending on the schedule, it may take a couple days for the prescription to be available. Someone will have to pick the prescription in person at the office.    • FOLLOW-UP VISITS  o You will need to follow up in the office with your surgeon in 3 weeks, or as instructed elsewhere in your discharge paperwork. Please call this number [phone] to schedule this appointment. If you are going to an SNF/SNU facility, they will arrange for you to follow up in the office.  o If you have any concerns or suspected complications prior to your follow up visit, please call the office. Do not wait until your appointment time if you suspect complications. These will need to be addressed in the office promptly.      Francis Bill II, MD  Orthopaedic Surgery  [facility]

## 2021-09-19 NOTE — PROGRESS NOTES
"Patient with increased confusion this morning.  She is very concerned about her knee and thinks something is wrong with it.  She is requesting a repeat x-ray which has been ordered.  I suspect this is all just related to her confusion, but we will check to be sure.  Family and patient wanting her to go home and not go to rehab.  Potassium back down to normal, I do appreciate medical comanagement.    R \"Boyd\" Fly REBOLLEDO MD  Orthopaedic Surgery  [facility]  [phone] - [place] Office  [phone] - [place] Office    "

## 2021-09-19 NOTE — THERAPY TREATMENT NOTE
Subjective: Pt agreeable to therapeutic plan of care. Patient screaming out and more confused, would not get out of chair and barely let anyone touch her.    Objective:     Bed mobility - N/A or Not attempted.  Transfers - N/A or Not attempted.  Ambulation -  feet N/A or Not attempted.    Pain: patient hollering out with any movement  Education: Provided education on importance of mobility and skilled verbal / tactile cueing throughout intervention.     Assessment: Bethany Weaver presents with functional mobility impairments which indicate the need for skilled intervention. Did not tolerate much during session today. Performed gentle ROM L LE and added new ice packs. Would holler out and then go to being lethargic and not following commands, nsg had given her meds to try and calm her down. Would benefit from rehab due to being heavy care at this level. Will continue to follow and progress as tolerated.     Plan/Recommendations:   Pt would benefit from Inpatient Rehabilitation placement at discharge from facility and requires no DME at discharge.   Pt desires Home with family assist and and Home Health at discharge. Pt cooperative; agreeable to therapeutic recommendations and plan of care.     Basic Mobility 6-click:  Rollin = Total, A lot = 2, A little = 3; 4 = None  Supine>Sit:   1 = Total, A lot = 2, A little = 3; 4 = None   Sit>Stand with arms:  1 = Total, A lot = 2, A little = 3; 4 = None  Bed>Chair:   1 = Total, A lot = 2, A little = 3; 4 = None  Ambulate in room:  1 = Total, A lot = 2, A little = 3; 4 = None  3-5 Steps with railin = Total, A lot = 2, A little = 3; 4 = None  Score: 10    Modified Wheeler: 5 = Severe disability (Requires constant nursing care and attention, bedridden, incontinent)    Post-Tx Position: Up in Chair, Alarms activated and Call light and personal items within reach  PPE: gloves, surgical mask, eyewear protection

## 2021-09-19 NOTE — PROGRESS NOTES
[facility] Medicine Services Daily Progress Note    Patient Name: Bethany Weaver  : 1945  MRN: [id number]  Primary Care Physician:  Shameka Rodriguez APRN  Date of admission: 2021      Subjective      Chief Complaint: Confusion today      Patient Reports Bethany Weaver is a 75 y.o. female patient is a 75-year-old pleasant white female who is now postop elective knee surgery left.  Denies any active symptomatology except for surgical related.  Per the sister patient has been confused today.  Does admit to baseline memory disorder worse at times.  [Dementia]    Review of Systems   Unable to perform ROS: dementia         Objective      Vitals:   Temp:  [98.4 °F (36.9 °C)-99.5 °F (37.5 °C)] 98.4 °F (36.9 °C)  Heart Rate:  [103-106] 103  Resp:  [12-18] 13  BP: (115-143)/(62-72) 115/69  Flow (L/min):  [0-2.5] 2.5    Physical Exam Vitals and nursing note reviewed.   Constitutional:       General: She is not in acute distress.     Appearance: Normal appearance. She is well-developed. She is not ill-appearing, toxic-appearing or diaphoretic.   HENT:      Head: Normocephalic and atraumatic.      Right Ear: Ear canal and external ear normal.      Left Ear: Ear canal and external ear normal.      Nose: Nose normal. No congestion or rhinorrhea.      Mouth/Throat:      Mouth: Mucous membranes are moist.      Pharynx: No oropharyngeal exudate.   Eyes:      General: No scleral icterus.        Right eye: No discharge.         Left eye: No discharge.      Extraocular Movements: Extraocular movements intact.      Conjunctiva/sclera: Conjunctivae normal.      Pupils: Pupils are equal, round, and reactive to light.   Neck:      Thyroid: No thyromegaly.      Vascular: No carotid bruit or JVD.      Trachea: No tracheal deviation.   Cardiovascular:      Rate and Rhythm: Normal rate and regular rhythm.      Pulses: Normal pulses.      Heart sounds: Normal heart sounds. No murmur heard.   No  friction rub. No gallop.    Pulmonary:      Effort: Pulmonary effort is normal. No respiratory distress.      Breath sounds: Normal breath sounds. No stridor. No wheezing, rhonchi or rales.   Chest:      Chest wall: No tenderness.   Abdominal:      General: Bowel sounds are normal. There is no distension.      Palpations: Abdomen is soft. There is no mass.      Tenderness: There is no abdominal tenderness. There is no guarding or rebound.      Hernia: No hernia is present.   Musculoskeletal:         General: No swelling, tenderness, deformity or signs of injury. Normal range of motion.      Cervical back: Normal range of motion and neck supple. No rigidity. No muscular tenderness.      Right lower leg: No edema.      Left lower leg: No edema.      Comments: Left knee postop pain and in cooling device   Lymphadenopathy:      Cervical: No cervical adenopathy.   Skin:     General: Skin is warm and dry.      Coloration: Skin is not jaundiced or pale.      Findings: No bruising, erythema or rash.   Neurological:      General: No focal deficit present.      Mental Status: She is alert and oriented to person, place, and time. Mental status is at baseline.      Cranial Nerves: No cranial nerve deficit.      Sensory: No sensory deficit.      Motor: No weakness or abnormal muscle tone.      Coordination: Coordination normal.   Psychiatric:         Mood and Affect: Mood normal.         Behavior: Behavior normal.         Thought Content: Thought content normal.         Judgment: Judgment normal.    Reviewed, no change in above data from the prior day.    Result Review    Result Review:  I have personally reviewed the results from the time of this admission to 9/19/2021 18:36 EDT and agree with these findings:  [x]  Laboratory  [x]  Microbiology  [x]  Radiology  []  EKG/Telemetry   []  Cardiology/Vascular   []  Pathology  []  Old records  []  Other:  Most notable findings include: Reviewed, await morning BMP       Wounds (last  24 hours)      LDA Wound     Row Name 09/19/21 0713 09/18/21 1910          Wound 09/17/21 1146 Left anterior knee Incision    Wound - Properties Group Placement Date: 09/17/21  -MS Placement Time: 1146  -MS Present on Hospital Admission: N  -MS Side: Left  -MS Orientation: anterior  -MS Location: knee  -MS Primary Wound Type: Incision  -MS    Dressing Appearance  dry;intact  -AR  dry;intact  -JR     Closure  VALERIA  -AR  VALERIA  -JR     Base  dressing in place, unable to visualize  -AR  dressing in place, unable to visualize  -JR     Retired Wound - Properties Group Date first assessed: 09/17/21  -MS Time first assessed: 1146  -MS Present on Hospital Admission: N  -MS Side: Left  -MS Location: knee  -MS Primary Wound Type: Incision  -MS      User Key  (r) = Recorded By, (t) = Taken By, (c) = Cosigned By    Initials Name Provider Type    Lili Moore LPN Licensed Nurse    Jie Mccormick, RN Registered Nurse    Lynne Maher LPN Licensed Nurse            Assessment/Plan      Brief Patient Summary:  Bethany Weaver is a 75 y.o. female who       aspirin, 325 mg, Oral, Daily  cloNIDine, 0.1 mg, Oral, Nightly  escitalopram, 10 mg, Oral, Daily  famotidine, 20 mg, Oral, BID AC  lidocaine, 1 patch, Transdermal, Q24H  melatonin, 20 mg, Oral, Nightly  meloxicam, 15 mg, Oral, Daily  risperiDONE, 0.25 mg, Oral, Nightly  sodium polystyrene, 15 g, Oral, Once       sodium chloride, 100 mL/hr, Last Rate: 100 mL/hr (09/18/21 1054)         Active Hospital Problems:  Active Hospital Problems    Diagnosis    • **Delirium with dementia    • Essential (primary) hypertension    • Hyperkalemia    • Total knee replacement status    • Mixed hyperlipidemia      Plan:   Hypertension:  Continue home medications   Options include-  beta-blockers  Calcium channel blockers  ACE inhibitor  Vasodilators  Low-dose diuretics  PRN medications have not been shown to affect outcomes-to be avoided           Hyperlipidemia:  Check lipid  panel  Statins if indicated  Add Ezetimibe if appropriate  Only Icosapent Ethyl (omega-3) demonstrated efficacy in Hypertriglyceridemia. (STRENGTH, REDUCE IT trials)     Hyperkalemia  Possibly due to meloxicam use and operative tissue trauma.  Assuming no additive potassium administered in IV fluids.  Kayexalate x1 dose      Delirium:  Will take several days to return to baseline.  Stop anticholinergics  Stop unnecessary medications  Avoid benzodiazepines  Add melatonin, clonidine if tolerated         DVT prophylaxis:  Mechanical DVT prophylaxis orders are present.    CODE STATUS:    Level Of Support Discussed With: Patient  Code Status: CPR  Medical Interventions (Level of Support Prior to Arrest): Full      Disposition:  I expect patient to be discharged when appropriate.  Change to inpatient due to delirium.    This patient has been examined wearing appropriate Personal Protective Equipment. 09/19/21      Electronically signed by Stewart Dave MD, 09/19/21, 18:36 EDT.  [facility] Hospitalist Team

## 2021-09-19 NOTE — PLAN OF CARE
Assessment: Bethany Weaver presents with functional mobility impairments which indicate the need for skilled intervention. Did not tolerate much during session today. Performed gentle ROM L LE and added new ice packs. Would holler out and then go to being lethargic and not following commands, nsg had given her meds to try and calm her down. Would benefit from rehab due to being heavy care at this level. Will continue to follow and progress as tolerated.

## 2021-09-20 LAB
ANION GAP SERPL CALCULATED.3IONS-SCNC: 7 MMOL/L (ref 5–15)
BASOPHILS # BLD AUTO: 0.1 10*3/MM3 (ref 0–0.2)
BASOPHILS NFR BLD AUTO: 0.7 % (ref 0–1.5)
BUN SERPL-MCNC: 13 MG/DL (ref 8–23)
BUN/CREAT SERPL: 15.7 (ref 7–25)
CALCIUM SPEC-SCNC: 8.5 MG/DL (ref 8.6–10.5)
CHLORIDE SERPL-SCNC: 101 MMOL/L (ref 98–107)
CO2 SERPL-SCNC: 28 MMOL/L (ref 22–29)
CREAT SERPL-MCNC: 0.83 MG/DL (ref 0.57–1)
DEPRECATED RDW RBC AUTO: 44.2 FL (ref 37–54)
EOSINOPHIL # BLD AUTO: 0.2 10*3/MM3 (ref 0–0.4)
EOSINOPHIL NFR BLD AUTO: 3 % (ref 0.3–6.2)
ERYTHROCYTE [DISTWIDTH] IN BLOOD BY AUTOMATED COUNT: 14 % (ref 12.3–15.4)
GFR SERPL CREATININE-BSD FRML MDRD: 67 ML/MIN/1.73
GLUCOSE SERPL-MCNC: 131 MG/DL (ref 65–99)
HCT VFR BLD AUTO: 28.9 % (ref 34–46.6)
HGB BLD-MCNC: 9.8 G/DL (ref 12–15.9)
LYMPHOCYTES # BLD AUTO: 1.8 10*3/MM3 (ref 0.7–3.1)
LYMPHOCYTES NFR BLD AUTO: 22.6 % (ref 19.6–45.3)
MCH RBC QN AUTO: 30.7 PG (ref 26.6–33)
MCHC RBC AUTO-ENTMCNC: 33.8 G/DL (ref 31.5–35.7)
MCV RBC AUTO: 90.7 FL (ref 79–97)
MONOCYTES # BLD AUTO: 0.8 10*3/MM3 (ref 0.1–0.9)
MONOCYTES NFR BLD AUTO: 10.2 % (ref 5–12)
NEUTROPHILS NFR BLD AUTO: 5 10*3/MM3 (ref 1.7–7)
NEUTROPHILS NFR BLD AUTO: 63.5 % (ref 42.7–76)
NRBC BLD AUTO-RTO: 0 /100 WBC (ref 0–0.2)
PLATELET # BLD AUTO: 186 10*3/MM3 (ref 140–450)
PMV BLD AUTO: 7.1 FL (ref 6–12)
POTASSIUM SERPL-SCNC: 4.5 MMOL/L (ref 3.5–5.2)
RBC # BLD AUTO: 3.18 10*6/MM3 (ref 3.77–5.28)
SODIUM SERPL-SCNC: 136 MMOL/L (ref 136–145)
WBC # BLD AUTO: 7.8 10*3/MM3 (ref 3.4–10.8)

## 2021-09-20 PROCEDURE — 97110 THERAPEUTIC EXERCISES: CPT

## 2021-09-20 PROCEDURE — 63710000001 ONDANSETRON PER 8 MG: Performed by: ORTHOPAEDIC SURGERY

## 2021-09-20 PROCEDURE — 85025 COMPLETE CBC W/AUTO DIFF WBC: CPT | Performed by: ORTHOPAEDIC SURGERY

## 2021-09-20 PROCEDURE — 80048 BASIC METABOLIC PNL TOTAL CA: CPT | Performed by: ORTHOPAEDIC SURGERY

## 2021-09-20 PROCEDURE — 99232 SBSQ HOSP IP/OBS MODERATE 35: CPT | Performed by: HOSPITALIST

## 2021-09-20 PROCEDURE — 97116 GAIT TRAINING THERAPY: CPT

## 2021-09-20 RX ORDER — OXYCODONE HYDROCHLORIDE AND ACETAMINOPHEN 5; 325 MG/1; MG/1
1 TABLET ORAL EVERY 4 HOURS PRN
Qty: 50 TABLET | Refills: 0 | Status: SHIPPED | OUTPATIENT
Start: 2021-09-20 | End: 2021-10-05 | Stop reason: SDUPTHER

## 2021-09-20 RX ADMIN — MELOXICAM 15 MG: 15 TABLET ORAL at 08:07

## 2021-09-20 RX ADMIN — ONDANSETRON HYDROCHLORIDE 4 MG: 4 TABLET, FILM COATED ORAL at 15:33

## 2021-09-20 RX ADMIN — ONDANSETRON HYDROCHLORIDE 4 MG: 4 TABLET, FILM COATED ORAL at 08:07

## 2021-09-20 RX ADMIN — RISPERIDONE 0.25 MG: 0.5 TABLET, ORALLY DISINTEGRATING ORAL at 20:15

## 2021-09-20 RX ADMIN — OXYCODONE 10 MG: 5 TABLET ORAL at 08:06

## 2021-09-20 RX ADMIN — FAMOTIDINE 20 MG: 20 TABLET, FILM COATED ORAL at 08:06

## 2021-09-20 RX ADMIN — FAMOTIDINE 20 MG: 20 TABLET, FILM COATED ORAL at 15:33

## 2021-09-20 RX ADMIN — Medication 20 MG: at 20:15

## 2021-09-20 RX ADMIN — DOCUSATE SODIUM 100 MG: 100 CAPSULE, LIQUID FILLED ORAL at 08:07

## 2021-09-20 RX ADMIN — ACETAMINOPHEN 650 MG: 325 TABLET, FILM COATED ORAL at 06:14

## 2021-09-20 RX ADMIN — CLONIDINE HYDROCHLORIDE 0.1 MG: 0.1 TABLET ORAL at 20:16

## 2021-09-20 RX ADMIN — ASPIRIN 325 MG: 325 TABLET, COATED ORAL at 08:07

## 2021-09-20 RX ADMIN — OXYCODONE 10 MG: 5 TABLET ORAL at 04:32

## 2021-09-20 RX ADMIN — LIDOCAINE 1 PATCH: 50 PATCH TOPICAL at 08:05

## 2021-09-20 RX ADMIN — ESCITALOPRAM OXALATE 10 MG: 10 TABLET ORAL at 08:07

## 2021-09-20 NOTE — PLAN OF CARE
Goal Outcome Evaluation:  Patient remains very confused, taking off dressing to left knee, unable to reorient, did stand up and walk short steps to bed, prn medications effective for pain

## 2021-09-20 NOTE — PLAN OF CARE
Problem: Adult Inpatient Plan of Care  Goal: Plan of Care Review  Outcome: Ongoing, Progressing  Goal: Patient-Specific Goal (Individualized)  Outcome: Ongoing, Progressing  Goal: Absence of Hospital-Acquired Illness or Injury  Outcome: Ongoing, Progressing  Intervention: Identify and Manage Fall Risk  Description: Perform standard risk assessment with a validated tool or comprehensive approach appropriate to the patient on admission; reassess fall risk frequently, with change in status or transfer to another level of care.  Communicate fall injury risk to interprofessional healthcare team.  Determine need for increased observation, equipment and environmental modification, such as low bed and signage, as well as supportive, nonskid footwear.  Adjust safety measures to individual developmental age, stage and identified risk factors.  Reinforce the importance of safety and physical activity with patient and family.  Perform regular intentional rounding to assess need for position change, pain assessment, personal needs, including assistance with toileting.  Recent Flowsheet Documentation  Taken 9/20/2021 1523 by Lili Bray LPN  Safety Promotion/Fall Prevention:   safety round/check completed   nonskid shoes/slippers when out of bed   fall prevention program maintained  Taken 9/20/2021 1300 by Lili Bray LPN  Safety Promotion/Fall Prevention:   safety round/check completed   nonskid shoes/slippers when out of bed   fall prevention program maintained  Taken 9/20/2021 1054 by Lili Bray LPN  Safety Promotion/Fall Prevention:   safety round/check completed   nonskid shoes/slippers when out of bed   fall prevention program maintained  Taken 9/20/2021 0917 by Lili Bray LPN  Safety Promotion/Fall Prevention:   safety round/check completed   nonskid shoes/slippers when out of bed   fall prevention program maintained  Taken 9/20/2021 0713 by Lili Bray LPN  Safety Promotion/Fall Prevention:    safety round/check completed   nonskid shoes/slippers when out of bed   fall prevention program maintained  Intervention: Prevent Skin Injury  Description: Assess skin risk on admission and at regular intervals throughout hospital stay.  Keep all areas of skin (especially folds) clean and dry.  Maintain adequate skin hydration.  Relieve and redistribute pressure and protect bony prominences; implement measures based on patient-specific risk factors.  Match turning and repositioning schedule to clinical condition.  Encourage weight shift frequently; assist with reposition if unable to complete independently.  Float heels off bed. Avoid pressure on the Achilles tendon.  Keep skin free from extended contact with medical devices.  Use aids (e.g., slide boards, mechanical lift) during transfer.  Recent Flowsheet Documentation  Taken 9/20/2021 0713 by Lili Bray LPN  Body Position: supine, legs elevated  Intervention: Prevent Infection  Description: Maintain skin and mucous membrane integrity; promote hand, oral and pulmonary hygiene.  Optimize fluid balance, nutrition, sleep and glycemic control to maximize infection resistance.  Identify potential sources of infection early to prevent or mitigate progression of infection (e.g., wound, lines, devices).  Evaluate ongoing need for invasive devices; remove promptly when no longer indicated.  Recent Flowsheet Documentation  Taken 9/20/2021 0713 by Lili Bray LPN  Infection Prevention: single patient room provided  Goal: Optimal Comfort and Wellbeing  Outcome: Ongoing, Progressing  Intervention: Provide Person-Centered Care  Description: Use a family-focused approach to care.  Develop trust and rapport by proactively providing information, encouraging questions, addressing concerns and offering reassurance.  Acknowledge emotional response to hospitalization.  Recognize and utilize personal coping strategies.  Honor spiritual and cultural preferences.  Recent  Flowsheet Documentation  Taken 9/20/2021 0713 by Lili Bray LPN  Trust Relationship/Rapport:   choices provided   emotional support provided   care explained   reassurance provided   thoughts/feelings acknowledged  Goal: Readiness for Transition of Care  Outcome: Ongoing, Progressing     Problem: Adult Inpatient Plan of Care  Goal: Absence of Hospital-Acquired Illness or Injury  Intervention: Prevent Skin Injury  Description: Assess skin risk on admission and at regular intervals throughout hospital stay.  Keep all areas of skin (especially folds) clean and dry.  Maintain adequate skin hydration.  Relieve and redistribute pressure and protect bony prominences; implement measures based on patient-specific risk factors.  Match turning and repositioning schedule to clinical condition.  Encourage weight shift frequently; assist with reposition if unable to complete independently.  Float heels off bed. Avoid pressure on the Achilles tendon.  Keep skin free from extended contact with medical devices.  Use aids (e.g., slide boards, mechanical lift) during transfer.  Recent Flowsheet Documentation  Taken 9/20/2021 0713 by Lili Bray LPN  Body Position: supine, legs elevated   Hourly rounding completed this shift, no complaints or needs voiced.  Patient observed with eyes open in sitting position in chair. Rise and fall of chest observed. Dressings clean dry and intact. Goal Outcome Evaluation:

## 2021-09-20 NOTE — DISCHARGE PLACEMENT REQUEST
"Bethany Brandon (75 y.o. Female)     Date of Birth Social Security Number Address Home Phone MRN    1945  [id number]  [place] [address] [phone] [id number]    Shinto Marital Status          Shinto        Admission Date Admission Type Admitting Provider Attending Provider Department, Room/Bed    9/17/21 Elective Francis Bill II, MD Sweet, Richard Alexander II, MD [facility] SURGICAL INPATIENT, 4112/1    Discharge Date Discharge Disposition Discharge Destination         Home or Self Care              Attending Provider: Francis Bill II, MD    Allergies: No Known Allergies    Isolation: None   Infection: MRSA No Isolation this Admit (09/17/21)   Code Status: CPR    Ht: 170.2 cm (67\")   Wt: 89 kg (196 lb 3.4 oz)    Admission Cmt: None   Principal Problem: Delirium with dementia [R41.0]                 Active Insurance as of 9/17/2021     Primary Coverage     Payor Plan Insurance Group Employer/Plan Group    HUMANA MEDICARE REPLACEMENT HU[employer]NA MEDICARE REPLACEMENT [id number]     Payor Plan Address Payor Plan Phone Number Payor Plan Fax Number Effective Dates    [address] [address] [phone]  1/1/2018 - None Entered    [facility] [address]       Subscriber Name Subscriber Birth Date Member ID       BETHANY BRANDON 1945 [id number]                 Emergency Contacts      (Rel.) Home Phone Work Phone Mobile Phone    Mona Ca (Daughter) [phone] -- [phone]               History & Physical      Francis Bill II, MD at 09/17/21 0807            Orthopaedic Surgery  History & Physical For Elective Total Knee  Dr. HUDSON “Boyd” Fly REBOLLEDO  [phone]    HPI:  Patient is a 75 y.o. Not  or  female who presents with End-stage arthritis of the left knee. They failed conservative treatment of their knee pain and a thorough discussion of the risks and benefits of surgery was had. The patient wishes to " "continue with elective total knee replacement, they were scheduled and are here for surgery. They did get medical clearance as well as a thorough preoperative workup.    MEDICAL HISTORY  Past Medical History:   Diagnosis Date   • Arthritis    • Depression    ·   Past Surgical History:   Procedure Laterality Date   • APPENDECTOMY     • PARATHYROIDECTOMY     ·   Prior to Admission medications    Medication Sig Start Date End Date Taking? Authorizing Provider   Cholecalciferol (VITAMIN D3) 5000 units capsule capsule Take 1,000 Units by mouth Daily. HOLD DOS   Yes Augusto Tadeo MD   escitalopram (LEXAPRO) 10 MG tablet Take 1 tablet by mouth Daily. 8/18/21  Yes Shameka Rodriguez APRN   Loratadine (CLARITIN PO) Take  by mouth.   Yes ProviderAugusto MD   Cyanocobalamin (B-12) 3000 MCG sublingual tablet Place  under the tongue.    ProviderAugusto MD   ·   · No Known Allergies  Most Recent Immunizations   Administered Date(s) Administered   • COVID-19 (PFIZER) 03/20/2021   • FLUAD TRI 65YR+ 10/30/2019   • Fluzone High Dose =>65 Years ([employer] ONLY) 10/13/2020   • Influenza Whole 09/03/2009   ·   Social History     Tobacco Use   • Smoking status: Never Smoker   • Smokeless tobacco: Never Used   Substance Use Topics   • Alcohol use: No   ·    Social History     Substance and Sexual Activity   Drug Use No   ·     REVIEW OF SYSTEMS:  · Head: negative for headache  · Respiratory: negative for shortness of breath.   · Cardiovascular: negative for chest pain.   · Gastrointestinal: negative abdominal pain.   · Neurological: negative for LOC  · Psychiatric/Behavioral: negative for memory loss.   · All other systems reviewed and are negative    VITALS: Ht 170.2 cm (67\")   Wt 79.4 kg (175 lb)   BMI 27.41 kg/m²  Body mass index is 27.41 kg/m².    PHYSICAL EXAM:   · CONSTITUTIONAL: A&Ox3, No acute distress  · LUNGS: Equal chest rise, no shortness of air  · CARDIOVASCULAR: palpable peripheral pulses  · SKIN: " no skin lesions in the area examined  · LYMPH: no lymphadenopathy in the area examined  · EXTREMITY: Knee  · Pulses:  Brisk Capillary Refill  · Sensation: Intact to Saphenous, Sural, Deep Peroneal, Superficial Peroneal, and Tibial Nerves and grossly throughout extremity  · Motor: 5/5 EHL/FHL/TA/GS motor complexes    RADIOLOGY REVIEW:   XR Chest PA & Lateral    Result Date: 9/10/2021  No acute process.  Electronically Signed By-Gagan Fernando MD On:9/10/2021 9:34 AM This report was finalized on [id number] by  Gagan Fernando MD.      LABS:   Results for the past 24 hours: No results found for this or any previous visit (from the past 24 hour(s)).    IMPRESSION:  Patient is a 75 y.o. Not  or  female with end-stage arthritis of the left knee    PLAN:   · Surgery: Elective total knee arthroplasty  · Consent: The risks and benefits of operative versus nonoperative treatment were discussed. The patient elected to undergo operative treatment of their knee arthritis. The risks discussed included but were not limited to blood clots, MI, stroke, other medical complications, infection, damage to neurovascular structures, continued pain, hardware prominence, loss of range of motion, need for further procedures, and and risk of anesthesia..  No guarantees were made   · Disposition: Elective left Total Knee Arthroplasty today.    Francis Bill II, MD  Orthopaedic Surgery  [facility]      Electronically signed by Francis Bill II, MD at 09/17/21 1123     H&P signed by New Onbase, [place] at 09/13/21 1111      Scan on 9/14/2021 by New Onbase, [employer]: H&amp;P, LOC, 09/08/2021          Electronically signed by New Onbase, [employer] at 09/13/21 1111          Physical Therapy Notes (most recent note)      Chioma Ace, PTA at 09/19/21 1615  Version 1 of 1       Subjective: Pt agreeable to therapeutic plan of care. Patient screaming out and more confused, would not get out of chair and  barely let anyone touch her.    Objective:     Bed mobility - N/A or Not attempted.  Transfers - N/A or Not attempted.  Ambulation -  feet N/A or Not attempted.    Pain: patient hollering out with any movement  Education: Provided education on importance of mobility and skilled verbal / tactile cueing throughout intervention.     Assessment: Bethany Weaver presents with functional mobility impairments which indicate the need for skilled intervention. Did not tolerate much during session today. Performed gentle ROM L LE and added new ice packs. Would holler out and then go to being lethargic and not following commands, nsg had given her meds to try and calm her down. Would benefit from rehab due to being heavy care at this level. Will continue to follow and progress as tolerated.     Plan/Recommendations:   Pt would benefit from Inpatient Rehabilitation placement at discharge from facility and requires no DME at discharge.   Pt desires Home with family assist and and Home Health at discharge. Pt cooperative; agreeable to therapeutic recommendations and plan of care.     Basic Mobility 6-click:  Rollin = Total, A lot = 2, A little = 3; 4 = None  Supine>Sit:   1 = Total, A lot = 2, A little = 3; 4 = None   Sit>Stand with arms:  1 = Total, A lot = 2, A little = 3; 4 = None  Bed>Chair:   1 = Total, A lot = 2, A little = 3; 4 = None  Ambulate in room:  1 = Total, A lot = 2, A little = 3; 4 = None  3-5 Steps with railin = Total, A lot = 2, A little = 3; 4 = None  Score: 10    Modified Rains: 5 = Severe disability (Requires constant nursing care and attention, bedridden, incontinent)    Post-Tx Position: Up in Chair, Alarms activated and Call light and personal items within reach  PPE: gloves, surgical mask, eyewear protection    Electronically signed by Chioma Ace PTA at [phone]       Occupational Therapy Notes (most recent note)    No notes exist for this encounter.

## 2021-09-20 NOTE — PLAN OF CARE
Bethany Weaver presents with functional mobility impairments which indicate the need for skilled intervention. Tolerating session today without incident. Pt continues to exhibit confusion and easily falls asleep requiring AAROM for therex.  PT continuing to requiring Fish-modA for safety with transfers and gait using RW.  Pt remains far from functional mobility baseline and will need extensive IP rehab.  Will continue to follow and progress as tolerated.     Plan/Recommendations:   Pt would benefit from Inpatient Rehabilitation placement at discharge from facility and requires no DME at discharge.   Pt desires Inpatient Rehabilitation placement at discharge. Pt cooperative; agreeable to therapeutic recommendations and plan of care.

## 2021-09-20 NOTE — CASE MANAGEMENT/SOCIAL WORK
Continued Stay Note  [facility]     Patient Name: Bethany Weaver  MRN: [id number]  Today's Date: 9/20/2021    Admit Date: 9/17/2021    Discharge Plan     Row Name 09/20/21 1640       Plan    Plan  DC Plan: Additional rehab choices pending. No precert needed (waived), no PASRR needed for [place] facility.    Patient/Family in Agreement with Plan  yes    Plan Comments  CM discussed rehab choices with RN provided by patient’s daughter/JOSIAH Dunn. First choice: [facility]. Second choice: [facility]. CM sent new referral to [facility] and liaison. Later received call that information was received and answered additional referral questions. S/w Marvin ([phone]) who reported Director of Nursing in meeting and has not been able to review patient yet. CM advised patient is ready for dc today (dc orders in at this time). [facility] advised they would contact  with determination once they review. RN updated via secure chat. CM confirmed their preferred pharmacy ([facility]). Did not receive any update so CM contacted [facility] who reported they declined due to patient’s dementia and possibly needing a secured unit which they do not have. CM sent new referral to [facility] basket and liaison. RN updated. Liaison reported no beds available. Avoidable day placed d/t dc orders in at this time.        Phone communication or documentation only - no physical contact with patient or family.    Sheila Wilson RN     Office Phone: [phone]  Office Cell: [phone]

## 2021-09-20 NOTE — PROGRESS NOTES
[facility] Medicine Services Daily Progress Note    Patient Name: Bethany Weaver  : 1945  MRN: [id number]  Primary Care Physician:  Shameka Rodriguez APRN  Date of admission: 2021      Subjective      Chief Complaint: Confusion today    Subjective  Patient pleasantly confused, denies for any new complaint, no nausea or vomiting, no chest pain.    Review of Systems   Unable to perform ROS: dementia         Objective      Vitals:   Temp:  [97.8 °F (36.6 °C)-100.3 °F (37.9 °C)] 97.8 °F (36.6 °C)  Heart Rate:  [74-94] 74  Resp:  [18] 18  BP: (103-121)/(48-63) 103/63  Flow (L/min):  [2] 2    Physical Exam Vitals and nursing note reviewed.   Constitutional:       General: She is not in acute distress.     Appearance: Normal appearance. She is well-developed. She is not ill-appearing, toxic-appearing or diaphoretic.   HENT:      Head: Normocephalic and atraumatic.      Right Ear: Ear canal and external ear normal.      Left Ear: Ear canal and external ear normal.      Nose: Nose normal. No congestion or rhinorrhea.      Mouth/Throat:      Mouth: Mucous membranes are moist.      Pharynx: No oropharyngeal exudate.   Eyes:      General: No scleral icterus.        Right eye: No discharge.         Left eye: No discharge.      Extraocular Movements: Extraocular movements intact.      Conjunctiva/sclera: Conjunctivae normal.      Pupils: Pupils are equal, round, and reactive to light.   Neck:      Thyroid: No thyromegaly.      Vascular: No carotid bruit or JVD.      Trachea: No tracheal deviation.   Cardiovascular:      Rate and Rhythm: Normal rate and regular rhythm.      Pulses: Normal pulses.      Heart sounds: Normal heart sounds. No murmur heard.   No friction rub. No gallop.    Pulmonary:      Effort: Pulmonary effort is normal. No respiratory distress.      Breath sounds: Normal breath sounds. No stridor. No wheezing, rhonchi or rales.   Chest:      Chest wall: No tenderness.    Abdominal:      General: Bowel sounds are normal. There is no distension.      Palpations: Abdomen is soft. There is no mass.      Tenderness: There is no abdominal tenderness. There is no guarding or rebound.      Hernia: No hernia is present.   Musculoskeletal:         General: No swelling, tenderness, deformity or signs of injury. Normal range of motion.      Cervical back: Normal range of motion and neck supple. No rigidity. No muscular tenderness.      Right lower leg: No edema.      Left lower leg: No edema.      Comments: Left knee postop pain and in cooling device   Lymphadenopathy:      Cervical: No cervical adenopathy.   Skin:     General: Skin is warm and dry.      Coloration: Skin is not jaundiced or pale.      Findings: No bruising, erythema or rash.   Neurological:      General: No focal deficit present.      Mental Status: She is alert and oriented to person, place, and time. Mental status is at baseline.      Cranial Nerves: No cranial nerve deficit.      Sensory: No sensory deficit.      Motor: No weakness or abnormal muscle tone.      Coordination: Coordination normal.   Psychiatric:         Mood and Affect: Mood normal.         Behavior: Behavior normal.         Thought Content: Thought content normal.         Judgment: Judgment normal.    Reviewed, no change in above data from the prior day.    Result Review    Result Review:  I have personally reviewed the results from the time of this admission to 9/20/2021 13:56 EDT and agree with these findings:  [x]  Laboratory  [x]  Microbiology  [x]  Radiology  []  EKG/Telemetry   []  Cardiology/Vascular   []  Pathology  []  Old records  []  Other:  Most notable findings include: Reviewed, await morning BMP       Wounds (last 24 hours)      LDA Wound     Row Name 09/20/21 0713 09/19/21 1915          Wound 09/17/21 1146 Left anterior knee Incision    Wound - Properties Group Placement Date: 09/17/21  -MS Placement Time: 1146  -MS Present on Hospital  Admission: N  -MS Side: Left  -MS Orientation: anterior  -MS Location: knee  -MS Primary Wound Type: Incision  -MS    Closure  VALERIA  -AR  VALERIA  -JR     Base  dressing in place, unable to visualize  -AR  dressing in place, unable to visualize  -JR     Retired Wound - Properties Group Date first assessed: 09/17/21  -MS Time first assessed: 1146  -MS Present on Hospital Admission: N  -MS Side: Left  -MS Location: knee  -MS Primary Wound Type: Incision  -MS      User Key  (r) = Recorded By, (t) = Taken By, (c) = Cosigned By    Initials Name Provider Type    Lili Moore LPN Licensed Nurse    Jie Mccormick, RN Registered Nurse    Lynne Maher LPN Licensed Nurse            Assessment/Plan      Brief Patient Summary:  Bethany Weaver is a 75 y.o. female who       aspirin, 325 mg, Oral, Daily  cloNIDine, 0.1 mg, Oral, Nightly  escitalopram, 10 mg, Oral, Daily  famotidine, 20 mg, Oral, BID AC  lidocaine, 1 patch, Transdermal, Q24H  melatonin, 20 mg, Oral, Nightly  meloxicam, 15 mg, Oral, Daily  risperiDONE, 0.25 mg, Oral, Nightly  sodium polystyrene, 15 g, Oral, Once       sodium chloride, 100 mL/hr, Last Rate: 100 mL/hr (09/18/21 1054)         Active Hospital Problems:  Active Hospital Problems    Diagnosis    • **Delirium with dementia    • Hyperkalemia    • Total knee replacement status    • Essential (primary) hypertension    • Mixed hyperlipidemia      Plan:   Hypertension:  Continue home medications   Options include-  beta-blockers  Calcium channel blockers  ACE inhibitor  Vasodilators  Low-dose diuretics  PRN medications have not been shown to affect outcomes-to be avoided     Status post knee surgery ... post op care as per primary service.     Hyperlipidemia:  Check lipid panel  Statins if indicated  Add Ezetimibe if appropriate  Only Icosapent Ethyl (omega-3) demonstrated efficacy in Hypertriglyceridemia. (STRENGTH, REDUCE IT trials)     Hyperkalemia  Possibly due to meloxicam use and operative tissue  trauma.  Assuming no additive potassium administered in IV fluids.  Kayexalate x1 dose      Delirium improving with likely underlying dementia.  Will take several days to return to baseline.  Stop anticholinergics  Stop unnecessary medications  Avoid benzodiazepines  Add melatonin, clonidine if tolerated         DVT prophylaxis:  Mechanical DVT prophylaxis orders are present.    CODE STATUS:    Level Of Support Discussed With: Patient  Code Status: CPR  Medical Interventions (Level of Support Prior to Arrest): Full      Disposition:  I expect patient to be discharged when appropriate.  Change to inpatient due to delirium.    This patient has been examined wearing appropriate Personal Protective Equipment. 09/20/21      Electronically signed by Ann Sousa MD, 09/20/21, 13:56 EDT.  [facility] Hospitalist Team

## 2021-09-20 NOTE — THERAPY TREATMENT NOTE
Subjective: Pt agreeable to therapeutic plan of care.    Objective:     Bed mobility - Mod-A  Transfers - Mod-A  Ambulation - 60 feet Min-A, Mod-A and with rolling walker    WB'ing status: L Lower Extremity Weight Bearing As Tolerated    Therapeutic Exercise: 10 Reps L Lower Extremity AAROM Total Knee: Ankle Pumps, Quad Sets, Heel slides, Hip Abduction, LAQ    Precautions: High falls risk    Pain: 5 VAS  Education: Provided education on importance of mobility and skilled verbal / tactile cueing throughout intervention.     Assessment: Bethany Weaver presents with functional mobility impairments which indicate the need for skilled intervention. Tolerating session today without incident. Pt continues to exhibit confusion and easily falls asleep requiring AAROM for therex.  PT continuing to requiring Fish-modA for safety with transfers and gait using RW.  Pt remains far from functional mobility baseline and will need extensive IP rehab.  Will continue to follow and progress as tolerated.     Plan/Recommendations:   Pt would benefit from Inpatient Rehabilitation placement at discharge from facility and requires no DME at discharge.   Pt desires Inpatient Rehabilitation placement at discharge. Pt cooperative; agreeable to therapeutic recommendations and plan of care.     Basic Mobility 6-click:  Rollin = Total, A lot = 2, A little = 3; 4 = None  Supine>Sit:   1 = Total, A lot = 2, A little = 3; 4 = None   Sit>Stand with arms:  1 = Total, A lot = 2, A little = 3; 4 = None  Bed>Chair:   1 = Total, A lot = 2, A little = 3; 4 = None  Ambulate in room:  1 = Total, A lot = 2, A little = 3; 4 = None  3-5 Steps with railin = Total, A lot = 2, A little = 3; 4 = None  Score: 11    Modified [place]: 4 = Moderately severe disability (Unable to attend to own bodily needs without assistance, and unable to walk unassisted)     Post-Tx Position: Up in Chair, Alarms activated and Call light and personal items within  reach, sister present   PPE: gloves, surgical mask, eyewear protection

## 2021-09-20 NOTE — THERAPY TREATMENT NOTE
Subjective: Pt agreeable to therapeutic plan of care.    Objective:     Bed mobility - Mod-A  Transfers - Mod-A  Ambulation - 60 feet with modA using RW    WB'ing status: L Lower Extremity Weight Bearing As Tolerated    Therapeutic Exercise: 10 Reps L Lower Extremity Total Knee: Ankle Pumps, Quad Sets, Heel slides, Hip Abduction, LAQ AAROM-PROM pending pt fatigue and ability to follow commands    Precautions: High falls risk    Pain: 5 VAS left knee with ther ex  Education: Provided education on importance of mobility and skilled verbal / tactile cueing throughout intervention.     Assessment: Bethany Weaver presents with functional mobility impairments which indicate the need for skilled intervention. Tolerating session today without incident. P continues to exhibit confusion but alertness and wakefulness improve while sitting EOB and with ambulation.  Pt exhibiting lethargy requiring verbal cues for attention to task with therex.  Pt able to progress with ambulation this date using RW with verbal cues.  Pt remains far from mobility baseline and continues to exhibit confusion placing pt at high risk for falls with mobility.  PT spoke to daughter and daughter is in agreement with referral to IP rehab to address deficits. Will continue to follow and progress as tolerated.     Plan/Recommendations:   Pt would benefit from Inpatient Rehabilitation placement at discharge from facility and requires no DME at discharge.   Pt desires Inpatient Rehabilitation placement at discharge. Pt cooperative; agreeable to therapeutic recommendations and plan of care.     Basic Mobility 6-click:  Rollin = Total, A lot = 2, A little = 3; 4 = None  Supine>Sit:   1 = Total, A lot = 2, A little = 3; 4 = None   Sit>Stand with arms:  1 = Total, A lot = 2, A little = 3; 4 = None  Bed>Chair:   1 = Total, A lot = 2, A little = 3; 4 = None  Ambulate in room:  1 = Total, A lot = 2, A little = 3; 4 = None  3-5 Steps with railin  = Total, A lot = 2, A little = 3; 4 = None  Score: 12    Modified [place]: 4 = Moderately severe disability (Unable to attend to own bodily needs without assistance, and unable to walk unassisted)     Post-Tx Position: Up in Chair, Alarms activated and Call light and personal items within reach, family present   PPE: gloves, surgical mask, eyewear protection

## 2021-09-20 NOTE — PROGRESS NOTES
"Patient did much better with physical therapy today than yesterday although still inappropriate for home discharge.  Planning to go to rehab.  Hopefully tomorrow.  Patient much more alert and interactive today than yesterday or the day before.  Seems to be improving significantly    R \"Boyd\" Fly REBOLLEDO MD  Orthopaedic Surgery  [facility]  [phone] - [place] Office  [phone] - [place] Office    "

## 2021-09-20 NOTE — PLAN OF CARE
Bethany Weaver presents with functional mobility impairments which indicate the need for skilled intervention. Tolerating session today without incident. P continues to exhibit confusion but alertness and wakefulness improve while sitting EOB and with ambulation.  Pt exhibiting lethargy requiring verbal cues for attention to task with therex.  Pt able to progress with ambulation this date using RW with verbal cues.  Pt remains far from mobility baseline and continues to exhibit confusion placing pt at high risk for falls with mobility.  PT spoke to daughter and daughter is in agreement with referral to IP rehab to address deficits. Will continue to follow and progress as tolerated.     Plan/Recommendations:   Pt would benefit from Inpatient Rehabilitation placement at discharge from facility and requires no DME at discharge.   Pt desires Inpatient Rehabilitation placement at discharge. Pt cooperative; agreeable to therapeutic recommendations and plan of care.

## 2021-09-21 PROCEDURE — 99232 SBSQ HOSP IP/OBS MODERATE 35: CPT | Performed by: HOSPITALIST

## 2021-09-21 PROCEDURE — 97116 GAIT TRAINING THERAPY: CPT

## 2021-09-21 PROCEDURE — 97110 THERAPEUTIC EXERCISES: CPT

## 2021-09-21 RX ADMIN — FAMOTIDINE 20 MG: 20 TABLET, FILM COATED ORAL at 08:37

## 2021-09-21 RX ADMIN — MELOXICAM 15 MG: 15 TABLET ORAL at 08:37

## 2021-09-21 RX ADMIN — ASPIRIN 325 MG: 325 TABLET, COATED ORAL at 08:37

## 2021-09-21 RX ADMIN — OXYCODONE 5 MG: 5 TABLET ORAL at 14:56

## 2021-09-21 RX ADMIN — LIDOCAINE 1 PATCH: 50 PATCH TOPICAL at 08:37

## 2021-09-21 RX ADMIN — CLONIDINE HYDROCHLORIDE 0.1 MG: 0.1 TABLET ORAL at 20:31

## 2021-09-21 RX ADMIN — RISPERIDONE 0.25 MG: 0.5 TABLET, ORALLY DISINTEGRATING ORAL at 20:30

## 2021-09-21 RX ADMIN — FAMOTIDINE 20 MG: 20 TABLET, FILM COATED ORAL at 17:51

## 2021-09-21 RX ADMIN — ESCITALOPRAM OXALATE 10 MG: 10 TABLET ORAL at 08:37

## 2021-09-21 RX ADMIN — Medication 20 MG: at 20:30

## 2021-09-21 NOTE — CASE MANAGEMENT/SOCIAL WORK
Continued Stay Note   Román     Patient Name: Bethany Weaver  MRN: [id number]  Today's Date: 9/21/2021    Admit Date: 9/17/2021    Discharge Plan     Row Name 09/21/21 1640       Plan    Plan  DC Plan: [facility] referral pending acceptance/bed availability. No precert needed (waived), PASRR per facility.    Patient/Family in Agreement with Plan  yes    Plan Comments  CM met with patient and daughter/POA Mona at bedside to discuss dc planning. CM pulled up Medicare.gov. New choices obtained for [facility] and [facility]. [facility] unable to accept (no subacute bed). Mona reported she did prefer [facility] but was now open to [place] facility. She also reported she was contacting facilities of her own to inquire about their visitor policy as she was needing to be able to visit patient. Discussed with [facility]ebonie [facility] ([phone]) who reported [facility] unable to accept but [facility] may be able to. Also reported that [facility] may have availability. CM added in basket and discussed with liaisonTreasure that she spoke to Mona regarding [facility] placement. CM also discussed with Mona who reported she did not want [facility]. New choices obtained for [facility]. Referral sent to [facility] basket and liaebonie who reported she was checking benefits. [facility] later reported unable to accept d/t patient not being appropriate for acute. Received call from [facility] liaison who reported that patient’s daughter contacted her and  would have a bed available tomorrow. CM added to basket for  liaison to review. Discussed with RN via secure chat. Avoidable days updated on placement delay.        Met with patient in room wearing PPE: mask/goggles.      Maintained distance greater than six feet and spent less than 15 minutes in the room.      Sheila Wilson RN     Office Phone: [phone]  Office Cell: [phone]

## 2021-09-21 NOTE — CASE MANAGEMENT/SOCIAL WORK
Continued Stay Note   Román     Patient Name: Bethany Weaver  MRN: [id number]  Today's Date: 9/21/2021    Admit Date: 9/17/2021    Discharge Plan     Row Name 09/21/21 0724       Plan    Plan  DC Plan: Additional rehab choices pending. No precert needed (waived), no PASRR needed for [place] facility.    Patient/Family in Agreement with Plan  unable to assess    Plan Comments  CM contacted patient’s daughter/PORICARDO Dunn ([phone]) to notify her that [facility] unable to accept as well as [facility] not having any open beds. No answer so VM was left. Provided CM name and number and requested callback with additional rehab choices. DC Barriers: New rehab referrals pending additional choices.     Phone communication or documentation only - no physical contact with patient or family.    Sheila Wilson RN     Office Phone: [phone]  Office Cell: [phone]

## 2021-09-21 NOTE — DISCHARGE PLACEMENT REQUEST
"Bethany Brandon (75 y.o. Female)     Date of Birth Social Security Number Address Home Phone MRN    1945  [id number]  [place] [address] [phone] [id number]    Confucianist Marital Status          Anglican        Admission Date Admission Type Admitting Provider Attending Provider Department, Room/Bed    9/17/21 Elective Francis Bill II, MD Sweet, Richard Alexander II, MD [facility] SURGICAL INPATIENT, 4112/1    Discharge Date Discharge Disposition Discharge Destination         Home or Self Care              Attending Provider: Francis Bill II, MD    Allergies: No Known Allergies    Isolation: None   Infection: MRSA No Isolation this Admit (09/17/21)   Code Status: CPR    Ht: 170.2 cm (67\")   Wt: 89 kg (196 lb 3.4 oz)    Admission Cmt: None   Principal Problem: Delirium with dementia [R41.0]                 Active Insurance as of 9/17/2021     Primary Coverage     Payor Plan Insurance Group Employer/Plan Group    HUMANA MEDICARE REPLACEMENT HUMANA MEDICARE REPLACEMENT [id number]     Payor Plan Address Payor Plan Phone Number Payor Plan Fax Number Effective Dates    [address] [address] [phone]  1/1/2018 - None Entered    [facility] [id number]       Subscriber Name Subscriber Birth Date Member ID       BETHANY BRANDON 1945 [id number]                 Emergency Contacts      (Rel.) Home Phone Work Phone Mobile Phone    Mona Ca (Daughter) [phone] -- [phone]               History & Physical      Francis Bill II, MD at 09/17/21 0807            Orthopaedic Surgery  History & Physical For Elective Total Knee  Dr. HUDSON “Boyd” Fly REBOLLEDO  [phone]    HPI:  Patient is a 75 y.o. Not  or  female who presents with End-stage arthritis of the left knee. They failed conservative treatment of their knee pain and a thorough discussion of the risks and benefits of surgery was had. The patient wishes to " "continue with elective total knee replacement, they were scheduled and are here for surgery. They did get medical clearance as well as a thorough preoperative workup.    MEDICAL HISTORY  Past Medical History:   Diagnosis Date   • Arthritis    • Depression    ·   Past Surgical History:   Procedure Laterality Date   • APPENDECTOMY     • PARATHYROIDECTOMY     ·   Prior to Admission medications    Medication Sig Start Date End Date Taking? Authorizing Provider   Cholecalciferol (VITAMIN D3) 5000 units capsule capsule Take 1,000 Units by mouth Daily. HOLD DOS   Yes Augusto Tadeo MD   escitalopram (LEXAPRO) 10 MG tablet Take 1 tablet by mouth Daily. 8/18/21  Yes Shameka Rodriguez APRN   Loratadine (CLARITIN PO) Take  by mouth.   Yes ProviderAugusto MD   Cyanocobalamin (B-12) 3000 MCG sublingual tablet Place  under the tongue.    ProviderAugusto MD   ·   · No Known Allergies  Most Recent Immunizations   Administered Date(s) Administered   • COVID-19 (PFIZER) 03/20/2021   • FLUAD TRI 65YR+ 10/30/2019   • Fluzone High Dose =>65 Years ([employer] ONLY) 10/13/2020   • Influenza Whole 09/03/2009   ·   Social History     Tobacco Use   • Smoking status: Never Smoker   • Smokeless tobacco: Never Used   Substance Use Topics   • Alcohol use: No   ·    Social History     Substance and Sexual Activity   Drug Use No   ·     REVIEW OF SYSTEMS:  · Head: negative for headache  · Respiratory: negative for shortness of breath.   · Cardiovascular: negative for chest pain.   · Gastrointestinal: negative abdominal pain.   · Neurological: negative for LOC  · Psychiatric/Behavioral: negative for memory loss.   · All other systems reviewed and are negative    VITALS: Ht 170.2 cm (67\")   Wt 79.4 kg (175 lb)   BMI 27.41 kg/m²  Body mass index is 27.41 kg/m².    PHYSICAL EXAM:   · CONSTITUTIONAL: A&Ox3, No acute distress  · LUNGS: Equal chest rise, no shortness of air  · CARDIOVASCULAR: palpable peripheral pulses  · SKIN: " no skin lesions in the area examined  · LYMPH: no lymphadenopathy in the area examined  · EXTREMITY: Knee  · Pulses:  Brisk Capillary Refill  · Sensation: Intact to Saphenous, Sural, Deep Peroneal, Superficial Peroneal, and Tibial Nerves and grossly throughout extremity  · Motor: 5/5 EHL/FHL/TA/GS motor complexes    RADIOLOGY REVIEW:   XR Chest PA & Lateral    Result Date: 9/10/2021  No acute process.  Electronically Signed By-Gagan Fernando MD On:9/10/2021 9:34 AM This report was finalized on [id number] by  Gagan Fernando MD.      LABS:   Results for the past 24 hours: No results found for this or any previous visit (from the past 24 hour(s)).    IMPRESSION:  Patient is a 75 y.o. Not  or  female with end-stage arthritis of the left knee    PLAN:   · Surgery: Elective total knee arthroplasty  · Consent: The risks and benefits of operative versus nonoperative treatment were discussed. The patient elected to undergo operative treatment of their knee arthritis. The risks discussed included but were not limited to blood clots, MI, stroke, other medical complications, infection, damage to neurovascular structures, continued pain, hardware prominence, loss of range of motion, need for further procedures, and and risk of anesthesia..  No guarantees were made   · Disposition: Elective left Total Knee Arthroplasty today.    Francis Bill II, MD  Orthopaedic Surgery  [facility]      Electronically signed by Francis Bill II, MD at 09/17/21 1123     H&P signed by New Onbase, [place] at 09/13/21 1111      Scan on 9/14/2021 by New Onbase, [place]: H&amp;P, LOC, 09/08/2021          Electronically signed by New Onbase, [place] at 09/13/21 1111          Physician Progress Notes (most recent note)      Frnacis Bill II, MD at 09/20/21 1822        Patient did much better with physical therapy today than yesterday although still inappropriate for home discharge.  Planning to go  "to rehab.  Hopefully tomorrow.  Patient much more alert and interactive today than yesterday or the day before.  Seems to be improving significantly    R \"Boyd\" Fly REBLOLEDO MD  Orthopaedic Surgery  [facility]  [phone] - [place] Office  [phone] - [place] Office      Electronically signed by Francis Bill II, MD at 09/20/21 5223          Physical Therapy Notes (most recent note)      Pita Mendosa, PT at 09/20/21 1539  Version 1 of 1       Bethany Weaver presents with functional mobility impairments which indicate the need for skilled intervention. Tolerating session today without incident. Pt continues to exhibit confusion and easily falls asleep requiring AAROM for therex.  PT continuing to requiring Fish-modA for safety with transfers and gait using RW.  Pt remains far from functional mobility baseline and will need extensive IP rehab.  Will continue to follow and progress as tolerated.     Plan/Recommendations:   Pt would benefit from Inpatient Rehabilitation placement at discharge from facility and requires no DME at discharge.   Pt desires Inpatient Rehabilitation placement at discharge. Pt cooperative; agreeable to therapeutic recommendations and plan of care.     Electronically signed by Pita Mendosa, PT at 09/20/21 4611         "

## 2021-09-21 NOTE — PROGRESS NOTES
[facility] Services Daily Progress Note    Patient Name: Bethany Weaver  : 1945  MRN: [id number]  Primary Care Physician:  Shameka Rodriguez APRN  Date of admission: 2021      Subjective      Chief Complaint: Confusion today    Subjective  Patient lucid but confused and pleasantly confused, patient compensating.  She is alert and oriented x0.    Review of Systems   Unable to perform ROS: dementia         Objective      Vitals:   Temp:  [97.9 °F (36.6 °C)-98.4 °F (36.9 °C)] 98.4 °F (36.9 °C)  Heart Rate:  [68-82] 68  Resp:  [12-18] 16  BP: (116-145)/(61-80) 145/80    Physical Exam Vitals and nursing note reviewed.   Constitutional:       General: She is not in acute distress.     Appearance: Normal appearance. She is well-developed. She is not ill-appearing, toxic-appearing or diaphoretic.   HENT:      Head: Normocephalic and atraumatic.      Right Ear: Ear canal and external ear normal.      Left Ear: Ear canal and external ear normal.      Nose: Nose normal. No congestion or rhinorrhea.      Mouth/Throat:      Mouth: Mucous membranes are moist.      Pharynx: No oropharyngeal exudate.   Eyes:      General: No scleral icterus.        Right eye: No discharge.         Left eye: No discharge.      Extraocular Movements: Extraocular movements intact.      Conjunctiva/sclera: Conjunctivae normal.      Pupils: Pupils are equal, round, and reactive to light.   Neck:      Thyroid: No thyromegaly.      Vascular: No carotid bruit or JVD.      Trachea: No tracheal deviation.   Cardiovascular:      Rate and Rhythm: Normal rate and regular rhythm.      Pulses: Normal pulses.      Heart sounds: Normal heart sounds. No murmur heard.   No friction rub. No gallop.    Pulmonary:      Effort: Pulmonary effort is normal. No respiratory distress.      Breath sounds: Normal breath sounds. No stridor. No wheezing, rhonchi or rales.   Chest:      Chest wall: No tenderness.   Abdominal:       General: Bowel sounds are normal. There is no distension.      Palpations: Abdomen is soft. There is no mass.      Tenderness: There is no abdominal tenderness. There is no guarding or rebound.      Hernia: No hernia is present.   Musculoskeletal:         General: No swelling, tenderness, deformity or signs of injury. Normal range of motion.      Cervical back: Normal range of motion and neck supple. No rigidity. No muscular tenderness.      Right lower leg: No edema.      Left lower leg: No edema.      Comments: Left knee postop pain and in cooling device   Lymphadenopathy:      Cervical: No cervical adenopathy.   Skin:     General: Skin is warm and dry.      Coloration: Skin is not jaundiced or pale.      Findings: No bruising, erythema or rash.   Neurological:      General: No focal deficit present.      Mental Status: She is alert and oriented to person, place, and time. Mental status is at baseline.      Cranial Nerves: No cranial nerve deficit.      Sensory: No sensory deficit.      Motor: No weakness or abnormal muscle tone.      Coordination: Coordination normal.   Psychiatric:         Mood and Affect: Mood normal.         Behavior: Behavior normal.         Thought Content: Thought content normal.         Judgment: Judgment normal.    Reviewed, no change in above data from the prior day.    Result Review    Result Review:  I have personally reviewed the results from the time of this admission to 9/21/2021 14:29 EDT and agree with these findings:  [x]  Laboratory  [x]  Microbiology  [x]  Radiology  []  EKG/Telemetry   []  Cardiology/Vascular   []  Pathology  []  Old records  []  Other:  Most notable findings include: Reviewed, await morning BMP       Wounds (last 24 hours)      LDA Wound     Row Name 09/21/21 0705 09/20/21 2015          Wound 09/17/21 1146 Left anterior knee Incision    Wound - Properties Group Placement Date: 09/17/21  -MS Placement Time: 1146  -MS Present on Hospital Admission: N  -MS  Side: Left  -MS Orientation: anterior  -MS Location: knee  -MS Primary Wound Type: Incision  -MS    Dressing Appearance  dry;intact  -BC  dry;intact  -TT     Closure  VALERIA  -BC  VALERIA  -TT     Base  dressing in place, unable to visualize  -BC  dressing in place, unable to visualize  -TT     Retired Wound - Properties Group Date first assessed: 09/17/21  -MS Time first assessed: 1146  -MS Present on Hospital Admission: N  -MS Side: Left  -MS Location: knee  -MS Primary Wound Type: Incision  -MS      User Key  (r) = Recorded By, (t) = Taken By, (c) = Cosigned By    Initials Name Provider Type    Nasra Jameson RN Registered Nurse    Jie Mccormick RN Registered Nurse    BC Analisa Tran LPN Licensed Nurse            Assessment/Plan      Brief Patient Summary:  Bethany Weaver is a 75 y.o. female who       aspirin, 325 mg, Oral, Daily  cloNIDine, 0.1 mg, Oral, Nightly  escitalopram, 10 mg, Oral, Daily  famotidine, 20 mg, Oral, BID AC  lidocaine, 1 patch, Transdermal, Q24H  melatonin, 20 mg, Oral, Nightly  meloxicam, 15 mg, Oral, Daily  risperiDONE, 0.25 mg, Oral, Nightly  sodium polystyrene, 15 g, Oral, Once       sodium chloride, 100 mL/hr, Last Rate: 100 mL/hr (09/18/21 1054)         Active Hospital Problems:  Active Hospital Problems    Diagnosis    • **Delirium with dementia    • Hyperkalemia    • Total knee replacement status    • Essential (primary) hypertension    • Mixed hyperlipidemia      Plan:   Hypertension:  Continue home medications   Options include-  beta-blockers  Calcium channel blockers  ACE inhibitor  Vasodilators  Low-dose diuretics  PRN medications have not been shown to affect outcomes-to be avoided     Status post knee surgery ... post op care as per primary service.     Hyperlipidemia:  Check lipid panel  Statins if indicated  Add Ezetimibe if appropriate  Only Icosapent Ethyl (omega-3) demonstrated efficacy in Hypertriglyceridemia. (STRENGTH, REDUCE IT  trials)     Hyperkalemia  Possibly due to meloxicam use and operative tissue trauma.  Assuming no additive potassium administered in IV fluids.  Kayexalate x1 dose      Delirium improving with likely underlying dementia improved.  Will take several days to return to baseline.  Stop anticholinergics  Stop unnecessary medications  Avoid benzodiazepines  Add melatonin, clonidine if tolerated         DVT prophylaxis:  Mechanical DVT prophylaxis orders are present.    CODE STATUS:    Level Of Support Discussed With: Patient  Code Status: CPR  Medical Interventions (Level of Support Prior to Arrest): Full      Disposition:  I expect patient to be discharged when appropriate.  Change to inpatient due to delirium.    This patient has been examined wearing appropriate Personal Protective Equipment. 09/21/21      Electronically signed by Ann Sousa MD, 09/21/21, 14:29 EDT.  [facility] Hospitalist Team            Interpolation Flap Text: A decision was made to reconstruct the defect utilizing an interpolation axial flap and a staged reconstruction.  A telfa template was made of the defect.  This telfa template was then used to outline the interpolation flap.  The donor area for the pedicle flap was then injected with anesthesia.  The flap was excised through the skin and subcutaneous tissue down to the layer of the underlying musculature.  The interpolation flap was carefully excised within this deep plane to maintain its blood supply.  The edges of the donor site were undermined.   The donor site was closed in a primary fashion.  The pedicle was then rotated into position and sutured.  Once the tube was sutured into place, adequate blood supply was confirmed with blanching and refill.  The pedicle was then wrapped with xeroform gauze and dressed appropriately with a telfa and gauze bandage to ensure continued blood supply and protect the attached pedicle.

## 2021-09-21 NOTE — THERAPY TREATMENT NOTE
Subjective: Pt agreeable to therapeutic plan of care.    Objective:     Bed mobility - up in chair  Transfers - Min-A, Mod-A and with rolling walker  Ambulation - 60 ft Min-A and with rolling walker     WB'ing status: L Lower Extremity Weight Bearing As Tolerated    Therapeutic Exercise: 10 Reps L Lower Extremity AAROM heel slides, hip abduction, ankle pumps, and LAQ    Left knee AAROM lacking ~5 degrees from neutral knee extension to 95 degrees flexion    Precautions: High falls risk    Pain: 5 VAS with therex    Education: Provided education on importance of mobility and skilled verbal / tactile cueing throughout intervention.     Assessment: Bethany Weaver presents with functional mobility impairments which indicate the need for skilled intervention. Tolerating session today without incident.   Pt with increased alertness this date and able to complete therex with improved participation this date.  Pt continues to need active assist for therex.  Pt continues to require modA for transfers and Fish-modA for safety with ambulation using RW.  Pt remains far from functional mobility baseline.  Will continue to follow and progress as tolerated.     Plan/Recommendations:   Pt would benefit from Inpatient Rehabilitation placement at discharge from facility and requires no DME at discharge.   Pt desires Inpatient Rehabilitation placement at discharge. Pt cooperative; agreeable to therapeutic recommendations and plan of care.     Basic Mobility 6-click:  Rollin = Total, A lot = 2, A little = 3; 4 = None  Supine>Sit:   1 = Total, A lot = 2, A little = 3; 4 = None   Sit>Stand with arms:  1 = Total, A lot = 2, A little = 3; 4 = None  Bed>Chair:   1 = Total, A lot = 2, A little = 3; 4 = None  Ambulate in room:  1 = Total, A lot = 2, A little = 3; 4 = None  3-5 Steps with railin = Total, A lot = 2, A little = 3; 4 = None  Score: 11    Modified Canadian: 4 = Moderately severe disability (Unable to attend to own  bodily needs without assistance, and unable to walk unassisted)     Post-Tx Position: Up in Chair, Alarms activated and Call light and personal items within reach, daughter present  PPE: gloves, surgical mask, eyewear protection

## 2021-09-21 NOTE — PLAN OF CARE
Bethany Weaver presents with functional mobility impairments which indicate the need for skilled intervention. Tolerating session today without incident.   Pt with increased alertness this date and able to complete therex with improved participation this date.  Pt continues to need active assist for therex.  Pt continues to require modA for transfers and Fish-modA for safety with ambulation using RW.  Pt remains far from functional mobility baseline.  Will continue to follow and progress as tolerated.     Plan/Recommendations:   Pt would benefit from Inpatient Rehabilitation placement at discharge from facility and requires no DME at discharge.   Pt desires Inpatient Rehabilitation placement at discharge. Pt cooperative; agreeable to therapeutic recommendations and plan of care.    Delivery Summary    Patient: Christin Gomez MRN: [id number]  SSN: [id number]    YOB: 1992  Age: 34 y.o. Sex: female          Information for the patient's :  Cristiano Mayorga [[id number]]       Labor Events:    Labor: No    Steroids: None   Cervical Ripening Date/Time:       Cervical Ripening Type: None   Antibiotics During Labor: No   Rupture Identifier: Sac 1    Rupture Date/Time: 2021 2:30 AM   Rupture Type: SROM   Amniotic Fluid Volume: Moderate    Amniotic Fluid Description: Clear    Amniotic Fluid Odor: None    Induction: None       Induction Date/Time:        Indications for Induction:      Augmentation: None   Augmentation Date/Time:      Indications for Augmentation:     Labor complications: Additional complications:        Delivery Events:  Indications For Episiotomy:     Episiotomy:     Perineal Laceration(s):     Repaired:     Periurethral Laceration Location:      Repaired:     Labial Laceration Location:     Repaired:     Sulcal Laceration Location:     Repaired:     Vaginal Laceration Location:     Repaired:     Cervical Laceration Location:     Repaired:     Repair Suture:     Number of Repair Packets:     Estimated Blood Loss (ml):  ml   Quantitaive Blood Loss (ml):             Delivery Date: 2021    Delivery Time: 6:59 AM   Delivery Type: , Low Transverse     Details    Trial of Labor: No   Primary/Repeat: Primary   Priority: Routine   Indications:  Breech       Sex:  Male     Gestational Age: 37w2d  Delivery Clinician:  Galilea Franco  Living Status: Living   Delivery Location: L&D OR#1          APGARS  One minute Five minutes Ten minutes   Skin color: 1   1        Heart rate: 2   2        Grimace: 2   2        Muscle tone: 2   2        Breathin   2        Totals: 9   9          Presentation: Breech    Position:        Resuscitation Method:  Suctioning-bulb; Tactile Stimulation     Meconium Stained: None      Cord Information: 3 Vessels  Complications: None  Cord around:    Delayed cord clamping? Yes  Cord clamped date/time:2021  7:00 AM  Disposition of Cord Blood: Lab    Blood Gases Sent?: No    Placenta:  Date/Time:    Removal:        Appearance:        Measurements:  Birth Weight:        Birth Length:        Head Circumference:        Chest Circumference:       Abdominal Girth: Other Providers:   Rachel YAO;LAURENT ZHAO;Kane CARRANZA, Obstetrician;Primary Nurse;Primary  Nurse             Group B Strep: No results found for: GRBSEXT, GRBSEXT  Information for the patient's :  Raul Nunez [[id number]]   No results found for: ABORH, PCTABR, PCTDIG, BILI, ABORHEXT, ABORH     No results for input(s): PCO2CB, PO2CB, HCO3I, SO2I, IBD, PTEMPI, SPECTI, PHICB, ISITE, IDEV, IALLEN in the last 72 hours.

## 2021-09-21 NOTE — PLAN OF CARE
Goal Outcome Evaluation:              Outcome Summary: Pt continues to be oriented to self only. VSS. Pain treated with PRN PO orders. Pt had removed SHEA dsg when this nurse assumed care of her this morning. Pt has removed border gauze/ace wrap x 2 today. Pt to dc to [facility] on 9/22.

## 2021-09-22 VITALS
SYSTOLIC BLOOD PRESSURE: 97 MMHG | BODY MASS INDEX: 32.18 KG/M2 | WEIGHT: 205.03 LBS | HEIGHT: 67 IN | TEMPERATURE: 98 F | HEART RATE: 73 BPM | OXYGEN SATURATION: 96 % | DIASTOLIC BLOOD PRESSURE: 62 MMHG | RESPIRATION RATE: 15 BRPM

## 2021-09-22 PROCEDURE — 99232 SBSQ HOSP IP/OBS MODERATE 35: CPT | Performed by: HOSPITALIST

## 2021-09-22 RX ADMIN — MELOXICAM 15 MG: 15 TABLET ORAL at 08:19

## 2021-09-22 RX ADMIN — FAMOTIDINE 20 MG: 20 TABLET, FILM COATED ORAL at 08:19

## 2021-09-22 RX ADMIN — LIDOCAINE 1 PATCH: 50 PATCH TOPICAL at 08:19

## 2021-09-22 RX ADMIN — ASPIRIN 325 MG: 325 TABLET, COATED ORAL at 08:19

## 2021-09-22 RX ADMIN — ESCITALOPRAM OXALATE 10 MG: 10 TABLET ORAL at 08:19

## 2021-09-22 RX ADMIN — DOCUSATE SODIUM 100 MG: 100 CAPSULE, LIQUID FILLED ORAL at 10:15

## 2021-09-22 NOTE — DISCHARGE SUMMARY
Orthopaedic Discharge Summary  Dr. TRISTAN Schmidt [facility]  [phone]    NAME: Bethany ALBERT Owen PCP: Shameka Rodriguez APRN   :  MRN: [id number]  [id number] LOS:  ADMIT: 3 days  2021   AGE/SEX: 75 y.o. female DC:  today             · Admitting Diagnosis: Osteoarthritis of knee [M17.10]  · Total knee replacement status [Z96.659]  · Delirium with dementia [R41.0]    · Surgery Performed: NV TOTAL KNEE ARTHROPLASTY [24789] (TOTAL KNEE ARTHROPLASTY)    · Discharge Medications:         Discharge Medications      New Medications      Instructions Start Date   aspirin  MG tablet   325 mg, Oral, Daily      oxyCODONE-acetaminophen 5-325 MG per tablet  Commonly known as: PERCOCET   1 tablet, Oral, Every 4 Hours PRN      oxyCODONE-acetaminophen 5-325 MG per tablet  Commonly known as: PERCOCET   1 tablet, Oral, Every 4 Hours PRN         Continue These Medications      Instructions Start Date   B-12 3000 MCG sublingual tablet   Sublingual      CLARITIN PO   Oral      escitalopram 10 MG tablet  Commonly known as: LEXAPRO   10 mg, Oral, Daily      vitamin D3 125 MCG (5000 UT) capsule capsule   1,000 Units, Oral, Daily, HOLD DOS             · Vitals:     Vitals:    21 2100 21 0100 21 0500 21 1100   BP: 113/62 111/65 115/69 97/62   BP Location:    Left arm   Patient Position:    Lying   Pulse: 82 73 80 73   Resp: 16 22 16 15   Temp: 98.9 °F (37.2 °C) 99 °F (37.2 °C)  98 °F (36.7 °C)   TempSrc:    Oral   SpO2: 90% 91% 92% 96%   Weight:   93 kg (205 lb 0.4 oz)    Height:           · Labs:      Admission on 2021   Component Date Value Ref Range Status   • ABO Type 2021 O   Final   • RH type 2021 Negative   Final   • Antibody Screen 2021 Negative   Final   • T&S Expiration Date 2021 11:59:59 PM   Final   • Glucose 2021 146* 65 - 99 mg/dL Final   • BUN 2021 11  8 - 23 mg/dL Final   • Creatinine 2021 0.78  0.57 - 1.00 mg/dL Final   •  Sodium 09/18/2021 139  136 - 145 mmol/L Final   • Potassium 09/18/2021 5.4* 3.5 - 5.2 mmol/L Final   • Chloride 09/18/2021 105  98 - 107 mmol/L Final   • CO2 09/18/2021 22.0  22.0 - 29.0 mmol/L Final   • Calcium 09/18/2021 8.4* 8.6 - 10.5 mg/dL Final   • eGFR Non  Amer 09/18/2021 72  >60 mL/min/1.73 Final   • BUN/Creatinine Ratio 09/18/2021 14.1  7.0 - 25.0 Final   • Anion Gap 09/18/2021 12.0  5.0 - 15.0 mmol/L Final   • WBC 09/18/2021 11.90* 3.40 - 10.80 10*3/mm3 Final   • RBC 09/18/2021 4.46  3.77 - 5.28 10*6/mm3 Final   • Hemoglobin 09/18/2021 13.0  12.0 - 15.9 g/dL Final   • Hematocrit 09/18/2021 40.2  34.0 - 46.6 % Final   • MCV 09/18/2021 90.2  79.0 - 97.0 fL Final   • MCH 09/18/2021 29.1  26.6 - 33.0 pg Final   • MCHC 09/18/2021 32.3  31.5 - 35.7 g/dL Final   • RDW 09/18/2021 14.0  12.3 - 15.4 % Final   • RDW-SD 09/18/2021 45.1  37.0 - 54.0 fl Final   • MPV 09/18/2021 7.0  6.0 - 12.0 fL Final   • Platelets 09/18/2021 266  140 - 450 10*3/mm3 Final   • Potassium 09/18/2021 5.6* 3.5 - 5.2 mmol/L Final   • Potassium 09/18/2021 4.4  3.5 - 5.2 mmol/L Final    Slight hemolysis detected by analyzer. Results may be affected.   • Glucose 09/20/2021 131* 65 - 99 mg/dL Final   • BUN 09/20/2021 13  8 - 23 mg/dL Final   • Creatinine 09/20/2021 0.83  0.57 - 1.00 mg/dL Final   • Sodium 09/20/2021 136  136 - 145 mmol/L Final   • Potassium 09/20/2021 4.5  3.5 - 5.2 mmol/L Final   • Chloride 09/20/2021 101  98 - 107 mmol/L Final   • CO2 09/20/2021 28.0  22.0 - 29.0 mmol/L Final   • Calcium 09/20/2021 8.5* 8.6 - 10.5 mg/dL Final   • eGFR Non  Amer 09/20/2021 67  >60 mL/min/1.73 Final   • BUN/Creatinine Ratio 09/20/2021 15.7  7.0 - 25.0 Final   • Anion Gap 09/20/2021 7.0  5.0 - 15.0 mmol/L Final   • WBC 09/20/2021 7.80  3.40 - 10.80 10*3/mm3 Final   • RBC 09/20/2021 3.18* 3.77 - 5.28 10*6/mm3 Final   • Hemoglobin 09/20/2021 9.8* 12.0 - 15.9 g/dL Final   • Hematocrit 09/20/2021 28.9* 34.0 - 46.6 % Final   • MCV  09/20/2021 90.7  79.0 - 97.0 fL Final   • MCH 09/20/2021 30.7  26.6 - 33.0 pg Final   • MCHC 09/20/2021 33.8  31.5 - 35.7 g/dL Final   • RDW 09/20/2021 14.0  12.3 - 15.4 % Final   • RDW-SD 09/20/2021 44.2  37.0 - 54.0 fl Final   • MPV 09/20/2021 7.1  6.0 - 12.0 fL Final   • Platelets 09/20/2021 186  140 - 450 10*3/mm3 Final   • Neutrophil % 09/20/2021 63.5  42.7 - 76.0 % Final   • Lymphocyte % 09/20/2021 22.6  19.6 - 45.3 % Final   • Monocyte % 09/20/2021 10.2  5.0 - 12.0 % Final   • Eosinophil % 09/20/2021 3.0  0.3 - 6.2 % Final   • Basophil % 09/20/2021 0.7  0.0 - 1.5 % Final   • Neutrophils, Absolute 09/20/2021 5.00  1.70 - 7.00 10*3/mm3 Final   • Lymphocytes, Absolute 09/20/2021 1.80  0.70 - 3.10 10*3/mm3 Final   • Monocytes, Absolute 09/20/2021 0.80  0.10 - 0.90 10*3/mm3 Final   • Eosinophils, Absolute 09/20/2021 0.20  0.00 - 0.40 10*3/mm3 Final   • Basophils, Absolute 09/20/2021 0.10  0.00 - 0.20 10*3/mm3 Final   • nRBC 09/20/2021 0.0  0.0 - 0.2 /100 WBC Final        No results found.    · Hospital Course:   75 y.o. female was admitted to [facility], [place], operated by [facility] to services of Francis Bill II, MD with Osteoarthritis of knee [M17.10]  Total knee replacement status [Z96.659]  Delirium with dementia [R41.0] on 9/17/2021 and underwent MT TOTAL KNEE ARTHROPLASTY [81417] (TOTAL KNEE ARTHROPLASTY). Post-operatively the patient transferred to the floor where the patient underwent mobilization therapy. Opioids were titrated to achieve appropriate pain management to allow for participation in mobilization exercises. Vital signs and laboratory values are now within safe parameters for discharge. The dressings and/or incision is intact without signs or symptoms of active infection. Operative extremity neurovascular status remains intact as compared to the preoperative exam. Appropriate education re: incision care, activity levels, medications, and follow up visits was completed and all questions were answered. The  "patient is now deemed stable for discharge.    SNF: The patient had a difficult time mobilizing sufficiently with physical therapy. Further rehabilitation was recommended in a SNF facility. Once a bed was available, and the patient was cleared, there were discharged to the SNF in good condition}.       R \"Boyd\" Fly REBOLLEDO MD  Orthopaedic Surgery  [facility]  [phone]                                               "

## 2021-09-22 NOTE — PLAN OF CARE
Goal Outcome Evaluation:           Progress: no change       Patient  is alert but not oriented. She has appeared to rest well through the night.   VSS, able to walk to the bathroom with walker with a stand by assist. Will continue to follow ordered plan of care.

## 2021-09-22 NOTE — CASE MANAGEMENT/SOCIAL WORK
Continued Stay Note  NARCISO [place]     Patient Name: Bethany Weaver  MRN: [id number]  Today's Date: 9/22/2021    Admit Date: 9/17/2021    Discharge Plan     Row Name 09/22/21 0747       Plan    Plan  DC Plan: Accepted to [facility], bed available 9/22, no precert needed (waived), PASRR per facility.    Plan Comments  CM contacted [facility] liaison to inquire if they had bed available today. Liaison confirmed that they are ready for patient and have provided patient’s daughter with specific instructions on many things. Pharmacy updated to [facility]. CM updated MD and RN via secure chat that patient has been accepted, bed is ready today, and pharmacy has been updated.    Final Discharge Disposition Code  03 - skilled nursing facility (SNF)    Final Note  DC orders in at this time for dc to [facility].        Phone communication or documentation only - no physical contact with patient or family.    Sheila Wilson RN     Office Phone: [phone]  Office Cell: [phone]

## 2021-09-22 NOTE — PROGRESS NOTES
"Patient doing just fine.  Minimal complaints of pain at this time.  Still awaiting rehab placement.  Discharge when bed available    R \"Boyd\" Fly REBOLLEDO MD  Orthopaedic Surgery  [facility]  [phone] - [place] Office  [phone] - [place] Office    "

## 2021-09-22 NOTE — PROGRESS NOTES
[facility] Medicine Services Daily Progress Note    Patient Name: Bethany Weaver  : 1945  MRN: [id number]  Primary Care Physician:  Shameka Rodriguez APRN  Date of admission: 2021      Subjective      Chief Complaint: Confusion today    Subjective  Patient alert and oriented times 2 today, much more interactive. Denies for any new complaint. No nausea or vomiting.    Review of Systems   Unable to perform ROS: dementia         Objective      Vitals:   Temp:  [98 °F (36.7 °C)-99 °F (37.2 °C)] 98 °F (36.7 °C)  Heart Rate:  [73-82] 73  Resp:  [15-22] 15  BP: ()/(62-69) 97/62    Physical Exam Vitals and nursing note reviewed.   Constitutional:       General: She is not in acute distress.     Appearance: Normal appearance. She is well-developed. She is not ill-appearing, toxic-appearing or diaphoretic.   HENT:      Head: Normocephalic and atraumatic.      Right Ear: Ear canal and external ear normal.      Left Ear: Ear canal and external ear normal.      Nose: Nose normal. No congestion or rhinorrhea.      Mouth/Throat:      Mouth: Mucous membranes are moist.      Pharynx: No oropharyngeal exudate.   Eyes:      General: No scleral icterus.        Right eye: No discharge.         Left eye: No discharge.      Extraocular Movements: Extraocular movements intact.      Conjunctiva/sclera: Conjunctivae normal.      Pupils: Pupils are equal, round, and reactive to light.   Neck:      Thyroid: No thyromegaly.      Vascular: No carotid bruit or JVD.      Trachea: No tracheal deviation.   Cardiovascular:      Rate and Rhythm: Normal rate and regular rhythm.      Pulses: Normal pulses.      Heart sounds: Normal heart sounds. No murmur heard.   No friction rub. No gallop.    Pulmonary:      Effort: Pulmonary effort is normal. No respiratory distress.      Breath sounds: Normal breath sounds. No stridor. No wheezing, rhonchi or rales.   Chest:      Chest wall: No tenderness.    Abdominal:      General: Bowel sounds are normal. There is no distension.      Palpations: Abdomen is soft. There is no mass.      Tenderness: There is no abdominal tenderness. There is no guarding or rebound.      Hernia: No hernia is present.   Musculoskeletal:         General: No swelling, tenderness, deformity or signs of injury. Normal range of motion.      Cervical back: Normal range of motion and neck supple. No rigidity. No muscular tenderness.      Right lower leg: No edema.      Left lower leg: No edema.      Comments: Left knee postop pain and in cooling device   Lymphadenopathy:      Cervical: No cervical adenopathy.   Skin:     General: Skin is warm and dry.      Coloration: Skin is not jaundiced or pale.      Findings: No bruising, erythema or rash.   Neurological:      General: No focal deficit present.      Mental Status: She is alert and oriented to person, place, and time. Mental status is at baseline.      Cranial Nerves: No cranial nerve deficit.      Sensory: No sensory deficit.      Motor: No weakness or abnormal muscle tone.      Coordination: Coordination normal.   Psychiatric:         Mood and Affect: Mood normal.         Behavior: Behavior normal.         Thought Content: Thought content normal.         Judgment: Judgment normal.    Reviewed, no change in above data from the prior day.    Result Review    Result Review:  I have personally reviewed the results from the time of this admission to 9/22/2021 12:17 EDT and agree with these findings:  [x]  Laboratory  [x]  Microbiology  [x]  Radiology  []  EKG/Telemetry   []  Cardiology/Vascular   []  Pathology  []  Old records  []  Other:  Most notable findings include: Reviewed, await morning BMP       Wounds (last 24 hours)      LDA Wound     Row Name 09/22/21 1100 09/22/21 0819 09/21/21 2032       Wound 09/17/21 1146 Left anterior knee Incision    Wound - Properties Group Placement Date: 09/17/21  -MS Placement Time: 1146  -MS Present on  Hospital Admission: N  -MS Side: Left  -MS Orientation: anterior  -MS Location: knee  -MS Primary Wound Type: Incision  -MS    Dressing Appearance  dry;intact  -CN  dry;intact  -CN  dry;intact  -SD    Closure  VALERIA  -CN  VALERIA  -CN  VALERIA  -SD    Base  dressing in place, unable to visualize  -CN  dressing in place, unable to visualize  -CN  dressing in place, unable to visualize  -SD    Drainage Characteristics/Odor  --  yellow;other (see comments) dried  -CN  --    Drainage Amount  --  scant  -CN  none  -SD    Dressing Care  --  border dressing  -CN  border dressing  -SD    Retired Wound - Properties Group Date first assessed: 09/17/21  -MS Time first assessed: 1146  -MS Present on Hospital Admission: N  -MS Side: Left  -MS Location: knee  -MS Primary Wound Type: Incision  -MS      User Key  (r) = Recorded By, (t) = Taken By, (c) = Cosigned By    Initials Name Provider Type    Loly Marquez LPN Licensed Nurse    Jie Mccormick, RN Registered Nurse    Arianne Lucero RN Registered Nurse            Assessment/Plan      Brief Patient Summary:  Bethany Weaver is a 75 y.o. female who       aspirin, 325 mg, Oral, Daily  escitalopram, 10 mg, Oral, Daily  famotidine, 20 mg, Oral, BID AC  lidocaine, 1 patch, Transdermal, Q24H  melatonin, 20 mg, Oral, Nightly  meloxicam, 15 mg, Oral, Daily  sodium polystyrene, 15 g, Oral, Once       sodium chloride, 100 mL/hr, Last Rate: 100 mL/hr (09/18/21 1054)         Active Hospital Problems:  Active Hospital Problems    Diagnosis    • **Delirium with dementia    • Hyperkalemia    • Total knee replacement status    • Essential (primary) hypertension    • Mixed hyperlipidemia      Plan:   Hypertension:  Continue home medications   Options include-  beta-blockers  Calcium channel blockers  ACE inhibitor  Vasodilators  Low-dose diuretics  PRN medications have not been shown to affect outcomes-to be avoided     Status post knee surgery ... post op care as per primary  service.     Hyperlipidemia:  Check lipid panel  Statins if indicated  Add Ezetimibe if appropriate  Only Icosapent Ethyl (omega-3) demonstrated efficacy in Hypertriglyceridemia. (STRENGTH, REDUCE IT trials)     Hyperkalemia resolved   Possibly due to meloxicam use and operative tissue trauma.  Assuming no additive potassium administered in IV fluids.  Received Kayexalate x1 dose      Delirium resolved with likely underlying dementia improved.  Will take several days to return to baseline.  Stop anticholinergics  Stop unnecessary medications  Avoid benzodiazepines  Add melatonin, clonidine if tolerated         DVT prophylaxis:  Mechanical DVT prophylaxis orders are present.    CODE STATUS:    Level Of Support Discussed With: Patient  Code Status: CPR  Medical Interventions (Level of Support Prior to Arrest): Full      Disposition:  I expect patient to be discharged when appropriate.  Change to inpatient due to delirium.    This patient has been examined wearing appropriate Personal Protective Equipment. 09/22/21      Electronically signed by Ann Sousa MD, 09/22/21, 12:17 EDT.  [facility] Hospitalist Team

## 2021-09-28 ENCOUNTER — HOME HEALTH ADMISSION (OUTPATIENT)
Dept: HOME HEALTH SERVICES | Facility: HOME HEALTHCARE | Age: 76
End: 2021-09-28

## 2021-09-28 ENCOUNTER — TRANSCRIBE ORDERS (OUTPATIENT)
Dept: HOME HEALTH SERVICES | Facility: HOME HEALTHCARE | Age: 76
End: 2021-09-28

## 2021-09-28 DIAGNOSIS — Z96.651 TOTAL KNEE REPLACEMENT STATUS, RIGHT: Primary | ICD-10-CM

## 2021-09-29 ENCOUNTER — HOME CARE VISIT (OUTPATIENT)
Dept: HOME HEALTH SERVICES | Facility: HOME HEALTHCARE | Age: 76
End: 2021-09-29

## 2021-09-29 VITALS
SYSTOLIC BLOOD PRESSURE: 144 MMHG | HEIGHT: 67 IN | RESPIRATION RATE: 18 BRPM | WEIGHT: 184 LBS | HEART RATE: 98 BPM | DIASTOLIC BLOOD PRESSURE: 82 MMHG | OXYGEN SATURATION: 98 % | BODY MASS INDEX: 28.88 KG/M2 | TEMPERATURE: 97.5 F

## 2021-09-29 PROCEDURE — G0151 HHCP-SERV OF PT,EA 15 MIN: HCPCS

## 2021-09-29 NOTE — HOME HEALTH
Patient is a 74yo female s/p LTKA on 9/17/21 by Dr. Boyd Bill II. Patient just discharged from [facility] in [place]. Patient currently staying with family for her rehab time being. Patient is very forgetful and requires cuing to stay on task. Skilled physical therapy necessary to address limited functional mobility after recent LTKA. Patient wishes to go to [facility] on [address].    LTKA -9-105   TUG 33.5seconds Incision L knee 14.5cm, scant drainage, applied island dressing. Patient prefers late morning visits.     PLAN FOR NEXT VISIT:  --Continue therapeutic exercises to improve L knee ROM -9-105  --Continue gait training with rolling nardaaker, progress to cane

## 2021-10-01 ENCOUNTER — HOME CARE VISIT (OUTPATIENT)
Dept: HOME HEALTH SERVICES | Facility: HOME HEALTHCARE | Age: 76
End: 2021-10-01

## 2021-10-01 VITALS
TEMPERATURE: 95.2 F | OXYGEN SATURATION: 97 % | HEART RATE: 100 BPM | SYSTOLIC BLOOD PRESSURE: 140 MMHG | DIASTOLIC BLOOD PRESSURE: 70 MMHG

## 2021-10-01 PROCEDURE — G0151 HHCP-SERV OF PT,EA 15 MIN: HCPCS

## 2021-10-01 NOTE — HOME HEALTH
Pt presented home with her family. She exhibits scant drainage on L distal knee, removed island dressing, and applied gauze/tape.   Pt has moderate L LE edema.   Call placed to [facility] to see if she has a plan for her to go to [facility]. Waiting to hear back.      Plan for next visit: review new standing exercises, stair training, and possiblly DC OASIS if all is going well.

## 2021-10-04 ENCOUNTER — HOME CARE VISIT (OUTPATIENT)
Dept: HOME HEALTH SERVICES | Facility: HOME HEALTHCARE | Age: 76
End: 2021-10-04

## 2021-10-04 VITALS
OXYGEN SATURATION: 98 % | SYSTOLIC BLOOD PRESSURE: 138 MMHG | HEART RATE: 88 BPM | TEMPERATURE: 96.5 F | DIASTOLIC BLOOD PRESSURE: 80 MMHG

## 2021-10-04 PROCEDURE — G0151 HHCP-SERV OF PT,EA 15 MIN: HCPCS

## 2021-10-05 ENCOUNTER — OFFICE VISIT (OUTPATIENT)
Dept: FAMILY MEDICINE CLINIC | Facility: CLINIC | Age: 76
End: 2021-10-05

## 2021-10-05 VITALS
DIASTOLIC BLOOD PRESSURE: 64 MMHG | HEIGHT: 67 IN | TEMPERATURE: 97.5 F | WEIGHT: 175 LBS | BODY MASS INDEX: 27.47 KG/M2 | SYSTOLIC BLOOD PRESSURE: 130 MMHG | HEART RATE: 74 BPM | OXYGEN SATURATION: 96 % | RESPIRATION RATE: 16 BRPM

## 2021-10-05 DIAGNOSIS — Z96.652 S/P TOTAL KNEE ARTHROPLASTY, LEFT: ICD-10-CM

## 2021-10-05 DIAGNOSIS — Z00.00 MEDICARE ANNUAL WELLNESS VISIT, SUBSEQUENT: Primary | ICD-10-CM

## 2021-10-05 DIAGNOSIS — R71.0 DECREASED HEMOGLOBIN: ICD-10-CM

## 2021-10-05 DIAGNOSIS — Z09 HOSPITAL DISCHARGE FOLLOW-UP: ICD-10-CM

## 2021-10-05 PROCEDURE — 99214 OFFICE O/P EST MOD 30 MIN: CPT | Performed by: NURSE PRACTITIONER

## 2021-10-05 PROCEDURE — 1160F RVW MEDS BY RX/DR IN RCRD: CPT | Performed by: NURSE PRACTITIONER

## 2021-10-05 PROCEDURE — 1170F FXNL STATUS ASSESSED: CPT | Performed by: NURSE PRACTITIONER

## 2021-10-05 PROCEDURE — G0439 PPPS, SUBSEQ VISIT: HCPCS | Performed by: NURSE PRACTITIONER

## 2021-10-05 PROCEDURE — 96160 PT-FOCUSED HLTH RISK ASSMT: CPT | Performed by: NURSE PRACTITIONER

## 2021-10-05 NOTE — PROGRESS NOTES
Subjective   Bethany Weaver is a 75 y.o. female.     History of Present Illness   Bethany Weaver 75 y.o. female presents today for hosptial follow up.  she was treated [facility] for left TKA.  I reviewed all of the labs and diagnostic testing.  The patient's medications were not changed:  Current outpatient and discharge medications have been reconciled for the patient.  Reviewed by: SONNY Damico    she does have a follow up appointment with a specialist:     Hospital note as follows:         · Hospital Course:   75 y.o. female was admitted to [facility] to services of Francis Bill II, MD with Osteoarthritis of knee [M17.10]  Total knee replacement status [Z96.659]  Delirium with dementia [R41.0] on 9/17/2021 and underwent [place] TOTAL KNEE ARTHROPLASTY [62255] (TOTAL KNEE ARTHROPLASTY). Post-operatively the patient transferred to the floor where the patient underwent mobilization therapy. Opioids were titrated to achieve appropriate pain management to allow for participation in mobilization exercises. Vital signs and laboratory values are now within safe parameters for discharge. The dressings and/or incision is intact without signs or symptoms of active infection. Operative extremity neurovascular status remains intact as compared to the preoperative exam. Appropriate education re: incision care, activity levels, medications, and follow up visits was completed and all questions were answered. The patient is now deemed stable for discharge.     SNF: The patient had a difficult time mobilizing sufficiently with physical therapy. Further rehabilitation was recommended in a SNF facility. Once a bed was available, and the patient was cleared, there were discharged to the SNF in good condition}.      Reports feeling well. She has been ambulating frequently and has graduated home PT. She will start [facility] this week and has f/u with Dr. Bill on Friday.  Denies redness, warmth,  swelling or drainage from incision site.  She is taking percocet 2-3 times per day.  Needs CBC rechecked.   The following portions of the patient's history were reviewed and updated as appropriate: allergies, current medications, past family history, past medical history, past social history, past surgical history and problem list.    Review of Systems   Constitutional: Negative for unexpected weight change.   Respiratory: Negative for shortness of breath.    Cardiovascular: Negative for chest pain and palpitations.   Endocrine: Negative for cold intolerance, heat intolerance, polydipsia, polyphagia and polyuria.   Musculoskeletal: Positive for arthralgias. Negative for gait problem.   Neurological: Negative for weakness and numbness.   Psychiatric/Behavioral: Negative for behavioral problems.       Objective   Physical Exam  Vitals and nursing note reviewed.   Constitutional:       Appearance: Normal appearance. She is well-developed.   Cardiovascular:      Rate and Rhythm: Normal rate and regular rhythm.   Pulmonary:      Effort: Pulmonary effort is normal.      Breath sounds: Normal breath sounds.   Skin:     Findings: Wound present.      Comments: Wound edges well approximated.  No erythema, warmth and drainage noted.    Neurological:      Mental Status: She is alert and oriented to person, place, and time.   Psychiatric:         Mood and Affect: Mood normal.         Behavior: Behavior normal.         Thought Content: Thought content normal.         Judgment: Judgment normal.         Assessment/Plan   Diagnoses and all orders for this visit:    1. Medicare annual wellness visit, subsequent (Primary)    2. Decreased hemoglobin  -     CBC & Differential  -     Basic metabolic panel  -     Vitamin B12  -     Folate    3. S/P total knee arthroplasty, left    4. Hospital discharge follow-up        Hospital records reviewed with pt confirming HPI.

## 2021-10-05 NOTE — PROGRESS NOTES
The ABCs of the Annual Wellness Visit  Subsequent Medicare Wellness Visit    Chief Complaint   Patient presents with   • Hospital Follow Up Visit     knee surgery 9/17/21   • Medicare Wellness-subsequent      Subjective    History of Present Illness:  Bethany Weaver is a 75 y.o. female who presents for a Subsequent Medicare Wellness Visit.    The following portions of the patient's history were reviewed and   updated as appropriate: allergies, current medications, past family history, past medical history, past social history, past surgical history and problem list.    Compared to one year ago, the patient feels her physical   health is the same.    Compared to one year ago, the patient feels her mental   health is the same.    Recent Hospitalizations:  This patient has had a [facility] admission record on file within the last 365 days.    Current Medical Providers:  Patient Care Team:  Shameka Rodriguez APRN as PCP - General (Family Medicine)    Outpatient Medications Prior to Visit   Medication Sig Dispense Refill   • Cyanocobalamin (B-12) 3000 MCG sublingual tablet Place  under the tongue.     • escitalopram (LEXAPRO) 10 MG tablet Take 1 tablet by mouth Daily. 90 tablet 3   • Loratadine (CLARITIN PO) Take  by mouth.     • oxyCODONE-acetaminophen (PERCOCET) 5-325 MG per tablet Take 1 tablet by mouth Every 4 (Four) Hours As Needed for Severe Pain . 50 tablet 0   • aspirin  MG tablet Take 1 tablet by mouth Daily. (Patient taking differently: Take 325 mg by mouth Daily. Per patient does not take all the time) 30 tablet 0   • Cholecalciferol (VITAMIN D3) 5000 units capsule capsule Take 1,000 Units by mouth Daily. HOLD DOS     • oxyCODONE-acetaminophen (PERCOCET) 5-325 MG per tablet Take 1 tablet by mouth Every 4 (Four) Hours As Needed for Severe Pain . 50 tablet 0     No facility-administered medications prior to visit.       Opioid medication/s are on active medication list.  and I have  "evaluated her active treatment plan and pain score trends (see table).  Vitals:    10/05/21 1339   PainSc:   4     I have reviewed the chart for potential of high risk medication and harmful drug interactions in the elderly.            Aspirin is on active medication list. Aspirin use is indicated based on review of current medical condition/s. Pros and cons of this therapy have been discussed today. Benefits of this medication outweigh potential harm.  Patient has been encouraged to continue taking this medication.  .      Patient Active Problem List   Diagnosis   • Osteopenia of multiple sites   • Anxiety and depression   • Cannot sleep   • Mixed hyperlipidemia   • Essential (primary) hypertension   • OA (osteoarthritis)   • Total knee replacement status   • Hyperkalemia   • Delirium with dementia     Advance Care Planning  Advance Directive is on file.  ACP discussion was held with the patient during this visit. Patient has an advance directive in EMR which is still valid.           Objective    Vitals:    10/05/21 1339   BP: 130/64   Pulse: 74   Resp: 16   Temp: 97.5 °F (36.4 °C)   SpO2: 96%   Weight: 79.4 kg (175 lb)   Height: 170.2 cm (67\")   PainSc:   4     BMI Readings from Last 1 Encounters:   10/25/21 27.00 kg/m²   BMI is above normal parameters. Recommendations include: exercise counseling    Does the patient have evidence of cognitive impairment? No    Physical Exam            HEALTH RISK ASSESSMENT    Smoking Status:  Social History     Tobacco Use   Smoking Status Never Smoker   Smokeless Tobacco Never Used     Alcohol Consumption:  Social History     Substance and Sexual Activity   Alcohol Use No     Fall Risk Screen:    MUNDOADI Fall Risk Assessment was completed, and patient is at LOW risk for falls.Assessment completed on:8/18/2021    Depression Screening:  PHQ-2/PHQ-9 Depression Screening 10/5/2021   Little interest or pleasure in doing things 0   Feeling down, depressed, or hopeless 0   Trouble " falling or staying asleep, or sleeping too much 0   Feeling tired or having little energy 0   Poor appetite or overeating 0   Feeling bad about yourself - or that you are a failure or have let yourself or your family down 0   Trouble concentrating on things, such as reading the newspaper or watching television 0   Moving or speaking so slowly that other people could have noticed. Or the opposite - being so fidgety or restless that you have been moving around a lot more than usual 0   Thoughts that you would be better off dead, or of hurting yourself in some way 0   Total Score 0   If you checked off any problems, how difficult have these problems made it for you to do your work, take care of things at home, or get along with other people? Not difficult at all       Health Habits and Functional and Cognitive Screening:  Functional & Cognitive Status 10/5/2021   Do you have difficulty preparing food and eating? No   Do you have difficulty bathing yourself, getting dressed or grooming yourself? No   Do you have difficulty using the toilet? No   Do you have difficulty moving around from place to place? No   Do you have trouble with steps or getting out of a bed or a chair? No   Current Diet Well Balanced Diet   Dental Exam Up to date   Eye Exam Up to date   Exercise (times per week) 4 times per week   Current Exercises Include Other   Current Exercise Activities Include -   Do you need help using the phone?  No   Are you deaf or do you have serious difficulty hearing?  No   Do you need help with transportation? Yes   Do you need help shopping? No   Do you need help preparing meals?  No   Do you need help with housework?  No   Do you need help with laundry? No   Do you need help taking your medications? No   Do you need help managing money? No   Do you ever drive or ride in a car without wearing a seat belt? No   Have you felt unusual stress, anger or loneliness in the last month? No   Who do you live with? Child   If  you need help, do you have trouble finding someone available to you? No   Have you been bothered in the last four weeks by sexual problems? No   Do you have difficulty concentrating, remembering or making decisions? Yes       Age-appropriate Screening Schedule:  Refer to the list below for future screening recommendations based on patient's age, sex and/or medical conditions. Orders for these recommended tests are listed in the plan section. The patient has been provided with a written plan.    Health Maintenance   Topic Date Due   • TDAP/TD VACCINES (1 - Tdap) Never done   • ZOSTER VACCINE (2 of 2) 02/26/2016   • DXA SCAN  04/18/2020   • LIPID PANEL  06/08/2021   • INFLUENZA VACCINE  08/01/2021   • MAMMOGRAM  Discontinued              Assessment/Plan   CMS Preventative Services Quick Reference  Risk Factors Identified During Encounter  Fall Risk-High or Moderate  The above risks/problems have been discussed with the patient.  Follow up actions/plans if indicated are seen below in the Assessment/Plan Section.  Pertinent information has been shared with the patient in the After Visit Summary.    Diagnoses and all orders for this visit:    1. Medicare annual wellness visit, subsequent (Primary)    2. Decreased hemoglobin  -     CBC & Differential  -     Basic metabolic panel  -     Vitamin B12  -     Folate    3. S/P total knee arthroplasty, left    4. Hospital discharge follow-up        Follow Up:   Return if symptoms worsen or fail to improve, for Next scheduled follow up.     An After Visit Summary and PPPS were made available to the patient.        I spent 20 minutes caring for Bethany on this date of service. This time includes time spent by me in the following activities:preparing for the visit, reviewing tests, obtaining and/or reviewing a separately obtained history, performing a medically appropriate examination and/or evaluation , counseling and educating the patient/family/caregiver and documenting  information in the medical record

## 2021-10-05 NOTE — PATIENT INSTRUCTIONS
Medicare Wellness  Personal Prevention Plan of Service     Date of Office Visit:  10/05/2021  Encounter Provider:  SONNY Damico  Place of Service:  [facility] PRIMARY [facility]  Patient Name: Bethany Weaver  :  1945    As part of the Medicare Wellness portion of your visit today, we are providing you with this personalized preventive plan of services (PPPS). This plan is based upon recommendations of the [place] Preventive Services Task Force (USPSTF) and the Advisory Committee on Immunization Practices (ACIP).    This lists the preventive care services that should be considered, and provides dates of when you are due. Items listed as completed are up-to-date and do not require any further intervention.    Health Maintenance   Topic Date Due   • TDAP/TD VACCINES (1 - Tdap) Never done   • Pneumococcal Vaccine 65+ (1 of 1 - PPSV23) Never done   • ZOSTER VACCINE (2 of 2) 2016   • DXA SCAN  2020   • LIPID PANEL  2021   • INFLUENZA VACCINE  2021   • ANNUAL WELLNESS VISIT  10/05/2022   • COLORECTAL CANCER SCREENING  2026   • COVID-19 Vaccine  Completed   • HEPATITIS C SCREENING  Discontinued   • MAMMOGRAM  Discontinued       Orders Placed This Encounter   Procedures   • Basic metabolic panel     Order Specific Question:   Release to patient     Answer:   Immediate   • Vitamin B12     Order Specific Question:   Release to patient     Answer:   Immediate   • Folate     Order Specific Question:   Release to patient     Answer:   Immediate   • CBC & Differential     Order Specific Question:   Manual Differential     Answer:   No       Return if symptoms worsen or fail to improve, for Next scheduled follow up.

## 2021-10-06 LAB
BASOPHILS # BLD AUTO: 0.1 X10E3/UL (ref 0–0.2)
BASOPHILS NFR BLD AUTO: 1 %
BUN SERPL-MCNC: 10 MG/DL (ref 8–27)
BUN/CREAT SERPL: 12 (ref 12–28)
CALCIUM SERPL-MCNC: 9.7 MG/DL (ref 8.7–10.3)
CHLORIDE SERPL-SCNC: 103 MMOL/L (ref 96–106)
CO2 SERPL-SCNC: 21 MMOL/L (ref 20–29)
CREAT SERPL-MCNC: 0.86 MG/DL (ref 0.57–1)
EOSINOPHIL # BLD AUTO: 0.2 X10E3/UL (ref 0–0.4)
EOSINOPHIL NFR BLD AUTO: 3 %
ERYTHROCYTE [DISTWIDTH] IN BLOOD BY AUTOMATED COUNT: 13 % (ref 11.7–15.4)
FOLATE SERPL-MCNC: 10.1 NG/ML
GLUCOSE SERPL-MCNC: NORMAL MG/DL
HCT VFR BLD AUTO: 38.8 % (ref 34–46.6)
HGB BLD-MCNC: 12.6 G/DL (ref 11.1–15.9)
IMM GRANULOCYTES # BLD AUTO: 0 X10E3/UL (ref 0–0.1)
IMM GRANULOCYTES NFR BLD AUTO: 0 %
LYMPHOCYTES # BLD AUTO: 1.9 X10E3/UL (ref 0.7–3.1)
LYMPHOCYTES NFR BLD AUTO: 23 %
MCH RBC QN AUTO: 29.2 PG (ref 26.6–33)
MCHC RBC AUTO-ENTMCNC: 32.5 G/DL (ref 31.5–35.7)
MCV RBC AUTO: 90 FL (ref 79–97)
MONOCYTES # BLD AUTO: 0.6 X10E3/UL (ref 0.1–0.9)
MONOCYTES NFR BLD AUTO: 7 %
NEUTROPHILS # BLD AUTO: 5.6 X10E3/UL (ref 1.4–7)
NEUTROPHILS NFR BLD AUTO: 66 %
PLATELET # BLD AUTO: 597 X10E3/UL (ref 150–450)
POTASSIUM SERPL-SCNC: NORMAL MMOL/L
RBC # BLD AUTO: 4.31 X10E6/UL (ref 3.77–5.28)
SODIUM SERPL-SCNC: 143 MMOL/L (ref 134–144)
VIT B12 SERPL-MCNC: >2000 PG/ML (ref 232–1245)
WBC # BLD AUTO: 8.4 X10E3/UL (ref 3.4–10.8)

## 2021-10-25 ENCOUNTER — OFFICE VISIT (OUTPATIENT)
Dept: NEUROLOGY | Facility: CLINIC | Age: 76
End: 2021-10-25

## 2021-10-25 VITALS
DIASTOLIC BLOOD PRESSURE: 78 MMHG | SYSTOLIC BLOOD PRESSURE: 128 MMHG | HEIGHT: 67 IN | HEART RATE: 68 BPM | OXYGEN SATURATION: 99 % | WEIGHT: 172.4 LBS | BODY MASS INDEX: 27.06 KG/M2

## 2021-10-25 DIAGNOSIS — R41.3 MEMORY LOSS: Primary | ICD-10-CM

## 2021-10-25 PROCEDURE — 99205 OFFICE O/P NEW HI 60 MIN: CPT | Performed by: PSYCHIATRY & NEUROLOGY

## 2021-10-25 RX ORDER — DONEPEZIL HYDROCHLORIDE 5 MG/1
5 TABLET, FILM COATED ORAL DAILY
Qty: 30 TABLET | Refills: 0 | Status: SHIPPED | OUTPATIENT
Start: 2021-10-25 | End: 2021-11-22

## 2021-10-25 NOTE — PROGRESS NOTES
Subjective:     Patient ID: Bethany Weaver is a 75 y.o. female.    Ms. Weaver is a 75-year-old both handed female with a history of cataracts, depression, and osteopenia who presents to the neurology clinic today as a new patient me for the evaluation of memory loss.  The patient is presenting with her daughter today, Mona who is her power of .  The patient had been taking Percocet once a day but may have not been taking for last 2 days.  On October 5 of 2021 her B12 level was over 2000.  On June 8 of 2020 her TSH was normal.  She had knee replacement on September 17 has been staying her daughter since then.  Previously she lived by herself in a house with her dog.  Patient is not really concerned about her memory.  Mona has been concerned for the past 6 months.  Surgery seemed to make things worse.  Noticed that things were worse with COVID.  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  Wasn't able to go to Jehovah's witness.  Has trouble coming up with words.  Puts things back where they shouldn't be.  Her personality has changed.  Seems very confused most of the time.  Is disoriented.  Forgot how old she was.  Can't remember her children's birthdays.   passed away 7 years ago.  But thought that it was 30 years.  Will say contradictory information.  Patient hasn't really noticed most of this.  There was family drama about it.  Daughter is a  and recently was diagnosed with MS.  The patient drives.  Daughter does not think that she is a safe .  Runs yellow lights.  No recent accidents.  Has left the car running at the [employer].  Daughter is not sure if she should be living by herself.  Didn't think that she was safe before her surgery.  No living will.  The patient describes her mood as pretty mild.  Daughter has been managing her meds since staying with her.  Had trouble by herself.  No falls.  Has a had time working the stove, [employer] and [employer].  Has trouble with  technology.  Had to get a new phone.  Has never learned how to use a computer.  Mona has been doing the financing when her dad passed away.  No hallucinations.  Graduated HS.  Went to beauty school.  Likes to read and watch TV.  Likes to go shopping.  Saw Dr. 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  Was not hospitalized.  Had cold/flu symptoms.      Alcohol? no  Smoking? no  Loss of consciousness/head trauma? no  Seizures/strokes/CNS infections? no  Family history of neurological disease? no    The following portions of the patient's history were reviewed and updated as appropriate: allergies, current medications, past family history, past medical history, past social history, past surgical history and problem list.    Review of Systems     Objective:    Neurologic Exam    Physical Exam    **The patient is wearing a mask**  Constitutional:  Vital signs reviewed.  No apparent distress.  Well groomed.  Eyes:  No injection, no icterus.    Respiratory:  Normal effort.  Clear to auscultation bilaterally.  Cardiovascular:  Regular rate and rhythm.  No murmurs.  No carotid bruits. Symmetric radial pulses.  Musculoskeletal: Normal station.  Gait steady.  Normal arm swing.  Patient able to walk on heels and toes.  Tandem gait intact.  Romberg negative.  Muscle tone and bulk normal in the bilateral upper and lower extremities.  Strength is 5/5 in the bilateral upper and lower extremities proximally and distally unless otherwise specified in the neurological exam.  Skin:  No rashes.  Warm, dry, and intact.  Psychiatric:  Good mood.  Normal affect.    Neurologic:  Mental status-  The patient scored an 11 out of 30 on her [place] today.  Cranial nerves- Pupils equally round and reactive to light with intact accomodation.  Visual fields intact.  Extraocular movements intact.  Facial sensation intact.   Hearing intact to finger-rub bilaterally.  SCM and trapezius are 5/5 bilaterally.    Motor-  See musculoskeletal above.   No tremor.  Reflexes- 2+ in the bilateral biceps, brachioradialis, patellar and achilles.  Toes down-going bilaterally.  Sensation- Intact to pinprick and vibration in bilateral upper and lower extremities symmetrically.  Coordination- Intact to finger tapping and heel knee shin bilaterally.   Gait- See musculoskeletal exam above.       Assessment/Plan:    The patient is a 75-year-old both handed female with history of cataracts, depression, and osteopenia who presents today for the evaluation of memory loss.    1.  Memory loss-The patient's memory problems are affecting her day-to-day and therefore she likely does have dementia.  We discussed that Alzheimer's is the most common cause of dementia.  I would recommend avoiding the use of Percocet.  I also recommend having a living will.  I do not recommend for the patient to drive.  We can go ahead and start on Aricept 5 mg daily.  After about a month if she is tolerating the medication okay we can increase to 10 mg.    A total of 60 minutes of time was spent on this encounter today.  This includes reviewing the patient's records, face-to-face time, and documentation.       Problems Addressed this Visit     None      Visit Diagnoses     Memory loss    -  Primary      Diagnoses       Codes Comments    Memory loss    -  Primary ICD-10-CM: R41.3  ICD-9-CM: 780.93

## 2021-10-25 NOTE — PATIENT INSTRUCTIONS
**Eat a high fiber diet    **Exercise regularly (physically and mentally)    **Floss daily    **Consider eating yogurt regularly    **Consider drinking green tea    **Limit soda and alcohol    **Ensure safety in the home    **Check out th Alzheimer's Association (www.alz.org).    **If you are interested in participating in a clinical trial, check out the following centers:   [facility]:  [email or web address]      [facility]:  [email or web address]     [facility]:  [email or web address]/[email or web address]    **If you live in [facility], consider [facility]. It is a free agency that helps find placement for seniors. They do an assessment and find out the need and know which facilities have openings and would be the best fit. They help figure out the financial aspects as well.  Marifer Ignacio is the nurse navigator and her number is [phone].

## 2021-10-28 RX ORDER — ASPIRIN 325 MG
325 TABLET, DELAYED RELEASE (ENTERIC COATED) ORAL DAILY
Qty: 90 TABLET | Refills: 1 | Status: SHIPPED | OUTPATIENT
Start: 2021-10-28

## 2021-10-28 NOTE — TELEPHONE ENCOUNTER
Caller: Mona Ca    Relationship: Emergency Contact    Requested Prescriptions:   Requested Prescriptions     Pending Prescriptions Disp Refills   • aspirin  MG tablet 30 tablet 0     Sig: Take 1 tablet by mouth Daily.   • vitamin D3 125 MCG (5000 UT) capsule capsule 30 capsule      Sig: Take 1 capsule by mouth Daily. HOLD [facility]        Pharmacy where request should be sent: [address] [facility] - [phone] [facility] [phone] FX      Additional details provided by patient: PLEASE ADVISE IF PATIENT SHOULD STILL BE TAKING THESE, IF SO PLEASE SEND IN REFILLS. IF NOT, PLEASE CALL PATIENTS DAUGHTER (ON  VER[place]) TO ADVISE.     Best call back number: [phone]    Does the patient have less than a 3 day supply:  [x] Yes  [] No    Stanford Peres Rep   10/28/21 09:22 EDT         
10/05/2021   
declines

## 2021-11-22 RX ORDER — DONEPEZIL HYDROCHLORIDE 5 MG/1
TABLET, FILM COATED ORAL
Qty: 30 TABLET | Refills: 0 | Status: SHIPPED | OUTPATIENT
Start: 2021-11-22 | End: 2021-12-27

## 2021-12-04 ENCOUNTER — APPOINTMENT (OUTPATIENT)
Dept: VACCINE CLINIC | Facility: HOSPITAL | Age: 76
End: 2021-12-04

## 2021-12-22 ENCOUNTER — HOSPITAL ENCOUNTER (OUTPATIENT)
Dept: BONE DENSITY | Facility: HOSPITAL | Age: 76
Discharge: HOME | End: 2021-12-22
Admitting: OTOLARYNGOLOGY

## 2021-12-22 DIAGNOSIS — M85.80 OSTEOPENIA, UNSPECIFIED LOCATION: ICD-10-CM

## 2021-12-22 DIAGNOSIS — Z78.0 POST-MENOPAUSAL: ICD-10-CM

## 2021-12-22 PROCEDURE — 77080 DXA BONE DENSITY AXIAL: CPT

## 2021-12-27 ENCOUNTER — TELEPHONE (OUTPATIENT)
Dept: NEUROLOGY | Facility: CLINIC | Age: 76
End: 2021-12-27

## 2021-12-27 RX ORDER — DONEPEZIL HYDROCHLORIDE 5 MG/1
TABLET, FILM COATED ORAL
Qty: 30 TABLET | Refills: 0 | Status: SHIPPED | OUTPATIENT
Start: 2021-12-27 | End: 2021-12-27 | Stop reason: SDUPTHER

## 2021-12-27 RX ORDER — DONEPEZIL HYDROCHLORIDE 5 MG/1
5 TABLET, FILM COATED ORAL DAILY
Qty: 30 TABLET | Refills: 5 | Status: SHIPPED | OUTPATIENT
Start: 2021-12-27 | End: 2022-04-25

## 2021-12-27 NOTE — TELEPHONE ENCOUNTER
Provider: RACHEL  Caller: JANETT BERNSTEIN  Relationship to Patient: DAUGHTER  Pharmacy: [id number]  Phone Number: [phone]  Reason for Call: PT DAUGHTER (POA) TEL TO ASK IF REFILLS CAN BE ADDED TO HER DONEPEZIL (ARICEPT) 5 MG TABLETS INSTEAD OF HAVING THE PHARMACY SEND A REFILL FAX EACH MONTH.    PLEASE CALL & ADVISE.    THANK YOU.

## 2022-04-25 ENCOUNTER — OFFICE VISIT (OUTPATIENT)
Dept: NEUROLOGY | Facility: CLINIC | Age: 77
End: 2022-04-25

## 2022-04-25 VITALS
BODY MASS INDEX: 41.42 KG/M2 | DIASTOLIC BLOOD PRESSURE: 90 MMHG | OXYGEN SATURATION: 97 % | SYSTOLIC BLOOD PRESSURE: 132 MMHG | HEIGHT: 55 IN | WEIGHT: 179 LBS | HEART RATE: 62 BPM

## 2022-04-25 DIAGNOSIS — G30.9 ALZHEIMER'S DEMENTIA WITHOUT BEHAVIORAL DISTURBANCE, UNSPECIFIED TIMING OF DEMENTIA ONSET: Primary | ICD-10-CM

## 2022-04-25 DIAGNOSIS — F02.80 ALZHEIMER'S DEMENTIA WITHOUT BEHAVIORAL DISTURBANCE, UNSPECIFIED TIMING OF DEMENTIA ONSET: Primary | ICD-10-CM

## 2022-04-25 PROCEDURE — 99215 OFFICE O/P EST HI 40 MIN: CPT | Performed by: PHYSICIAN ASSISTANT

## 2022-04-25 RX ORDER — DONEPEZIL HYDROCHLORIDE 10 MG/1
10 TABLET, FILM COATED ORAL NIGHTLY
Qty: 90 TABLET | Refills: 1 | Status: SHIPPED | OUTPATIENT
Start: 2022-04-25 | End: 2022-09-26 | Stop reason: SDUPTHER
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
## 2022-04-25 NOTE — PATIENT INSTRUCTIONS
"What is dementia?   -- Dementia is the general term for a group of brain disorders that cause memory problems and make it hard to think clearly     What symptoms does dementia cause?   -- The symptoms of dementia often start off very mild and get worse slowly. Symptoms can include:  ?Forgetfulness  ?Acting confused or disoriented  ?Trouble with speech and writing (for example, not being able to find the right words for things)  ?Trouble concentrating and reasoning  ?Problems with tasks such as paying bills or balancing a checkbook  ?Getting lost in familiar places  As dementia gets worse, people might:  ?Have episodes of anger or aggression  ?See things that aren't there or believe things that aren't true  ?Be unable to eat, bathe, dress, or do other everyday tasks  ?Lose bladder and bowel control    What are the different kinds of dementia?   -- The most common kinds include:  ?Alzheimer disease - Alzheimer disease is the most common cause of dementia. It is a disorder in which brain cells slowly die over time.  ?Vascular dementia - Vascular dementia happens when parts of the brain do not get enough blood. This can happen when blood vessels in the brain get blocked with blood clots or damaged by high blood pressure or aging. This form of dementia is most common among people who have had strokes or who are at risk for strokes.  ?Parkinson disease dementia - Parkinson disease is a brain disorder that affects movement. It causes trembling, stiffness, and slowness. As Parkinson disease gets worse, some people develop dementia. \"Lewy body dementia\" is a related form of dementia.   ?Other causes of dementia - Dementia can also happen if a person's brain has been damaged. For example, having many head injuries can lead to dementia.    Should I see a doctor or nurse?   -- Yes, you should see a doctor or nurse if you think you or someone close to you is showing signs of dementia. Sometimes memory loss and confusion are " caused by medical problems other than dementia that can be treated. For example, people with diabetes sometimes show signs of confusion when their blood sugar is not well controlled.    Are there tests I should have?   -- Your doctor or nurse will decide which tests you should have based on your individual situation. Many people with signs of dementia do not need a brain scan. That's because the tests that are most useful are the ones that look at how you answer questions and do certain tasks. Even so, your doctor might want to do a brain scan (either CT or MRI) to make sure that your symptoms are not caused by a problem unrelated to dementia.    How is dementia treated?   -- That depends on what kind of dementia you have. If you have Alzheimer disease, there are medicines that might help some. If you have vascular dementia, your doctor will focus on keeping your blood pressure and cholesterol as close to normal as possible. Doing that can help reduce further damage to the brain.  Sadly, there really aren't good treatments for most types of dementia. But doctors can sometimes treat troubling symptoms that come with dementia, such as depression or anxiety.    How do I stay safe?   -- If you have dementia, you might not be aware of how much your condition affects you. Trust your family and friends to tell you when it is no longer safe for you to drive, cook, or do other things that could be dangerous.  Be aware, too, that people with dementia often fall and hurt themselves. To reduce the risk of falls, it's a good idea to:  ?Secure loose rugs or use non-skid backing on rugs  ?Tuck away loose wires or electrical cords  ?Wear sturdy, comfortable shoes  ?Keep walkways well lit    Can dementia be prevented?   -- There are no proven ways to prevent dementia. But here are some things that seem to help keep the brain healthy:  ?Physical activity  ?A healthy diet  ?Social interaction

## 2022-09-13 ENCOUNTER — TELEPHONE (OUTPATIENT)
Dept: NEUROLOGY | Facility: CLINIC | Age: 77
End: 2022-09-13

## 2022-09-13 DIAGNOSIS — F41.9 ANXIETY: ICD-10-CM

## 2022-09-13 RX ORDER — ESCITALOPRAM OXALATE 10 MG/1
TABLET ORAL
Qty: 90 TABLET | Refills: 0 | Status: SHIPPED | OUTPATIENT
Start: 2022-09-13 | End: 2022-09-26 | Stop reason: SDUPTHER

## 2022-09-13 NOTE — TELEPHONE ENCOUNTER
Daughter(poa) came in and states that sister picked up medication (Aricept) 10 MG and will not give it to patient.  Patient is needing this medication.  Patient has 3 days left.  Please advise.     lst seen by Joana 4-25-22. Thank you    POA update as well as verbal release

## 2022-09-16 RX ORDER — DONEPEZIL HYDROCHLORIDE 10 MG/1
10 TABLET, FILM COATED ORAL NIGHTLY
Qty: 30 TABLET | Refills: 2 | Status: SHIPPED | OUTPATIENT
Start: 2022-09-16 | End: 2022-12-23

## 2022-09-26 ENCOUNTER — OFFICE VISIT (OUTPATIENT)
Dept: FAMILY MEDICINE CLINIC | Facility: CLINIC | Age: 77
End: 2022-09-26

## 2022-09-26 VITALS
OXYGEN SATURATION: 98 % | SYSTOLIC BLOOD PRESSURE: 110 MMHG | DIASTOLIC BLOOD PRESSURE: 80 MMHG | HEIGHT: 67 IN | TEMPERATURE: 97.5 F | HEART RATE: 68 BPM | BODY MASS INDEX: 27.94 KG/M2 | RESPIRATION RATE: 16 BRPM | WEIGHT: 178 LBS

## 2022-09-26 DIAGNOSIS — I10 ESSENTIAL (PRIMARY) HYPERTENSION: Primary | ICD-10-CM

## 2022-09-26 DIAGNOSIS — F41.9 ANXIETY: ICD-10-CM

## 2022-09-26 DIAGNOSIS — E78.2 MIXED HYPERLIPIDEMIA: ICD-10-CM

## 2022-09-26 DIAGNOSIS — E55.9 VITAMIN D DEFICIENCY: ICD-10-CM

## 2022-09-26 LAB
25(OH)D3+25(OH)D2 SERPL-MCNC: 86.6 NG/ML (ref 30–100)
ALBUMIN SERPL-MCNC: 4.5 G/DL (ref 3.5–5.2)
ALBUMIN/GLOB SERPL: 2 G/DL
ALP SERPL-CCNC: 135 U/L (ref 39–117)
ALT SERPL-CCNC: 13 U/L (ref 1–33)
AST SERPL-CCNC: 14 U/L (ref 1–32)
BASOPHILS # BLD AUTO: 0.05 10*3/MM3 (ref 0–0.2)
BASOPHILS NFR BLD AUTO: 0.7 % (ref 0–1.5)
BILIRUB SERPL-MCNC: 0.3 MG/DL (ref 0–1.2)
BUN SERPL-MCNC: 14 MG/DL (ref 8–23)
BUN/CREAT SERPL: 15.4 (ref 7–25)
CALCIUM SERPL-MCNC: 9.8 MG/DL (ref 8.6–10.5)
CHLORIDE SERPL-SCNC: 104 MMOL/L (ref 98–107)
CHOLEST SERPL-MCNC: 242 MG/DL (ref 0–200)
CO2 SERPL-SCNC: 29.2 MMOL/L (ref 22–29)
CREAT SERPL-MCNC: 0.91 MG/DL (ref 0.57–1)
EGFRCR SERPLBLD CKD-EPI 2021: 66.3 ML/MIN/1.73
EOSINOPHIL # BLD AUTO: 0.13 10*3/MM3 (ref 0–0.4)
EOSINOPHIL NFR BLD AUTO: 1.9 % (ref 0.3–6.2)
ERYTHROCYTE [DISTWIDTH] IN BLOOD BY AUTOMATED COUNT: 12.4 % (ref 12.3–15.4)
GLOBULIN SER CALC-MCNC: 2.3 GM/DL
GLUCOSE SERPL-MCNC: 73 MG/DL (ref 65–99)
HCT VFR BLD AUTO: 43.7 % (ref 34–46.6)
HDLC SERPL-MCNC: 78 MG/DL (ref 40–60)
HGB BLD-MCNC: 14.5 G/DL (ref 12–15.9)
IMM GRANULOCYTES # BLD AUTO: 0.02 10*3/MM3 (ref 0–0.05)
IMM GRANULOCYTES NFR BLD AUTO: 0.3 % (ref 0–0.5)
LDLC SERPL CALC-MCNC: 144 MG/DL (ref 0–100)
LYMPHOCYTES # BLD AUTO: 2.11 10*3/MM3 (ref 0.7–3.1)
LYMPHOCYTES NFR BLD AUTO: 30.4 % (ref 19.6–45.3)
MCH RBC QN AUTO: 28.9 PG (ref 26.6–33)
MCHC RBC AUTO-ENTMCNC: 33.2 G/DL (ref 31.5–35.7)
MCV RBC AUTO: 87.2 FL (ref 79–97)
MONOCYTES # BLD AUTO: 0.52 10*3/MM3 (ref 0.1–0.9)
MONOCYTES NFR BLD AUTO: 7.5 % (ref 5–12)
NEUTROPHILS # BLD AUTO: 4.12 10*3/MM3 (ref 1.7–7)
NEUTROPHILS NFR BLD AUTO: 59.2 % (ref 42.7–76)
NRBC BLD AUTO-RTO: 0 /100 WBC (ref 0–0.2)
PLATELET # BLD AUTO: 317 10*3/MM3 (ref 140–450)
POTASSIUM SERPL-SCNC: 5.1 MMOL/L (ref 3.5–5.2)
PROT SERPL-MCNC: 6.8 G/DL (ref 6–8.5)
RBC # BLD AUTO: 5.01 10*6/MM3 (ref 3.77–5.28)
SODIUM SERPL-SCNC: 141 MMOL/L (ref 136–145)
TRIGL SERPL-MCNC: 116 MG/DL (ref 0–150)
TSH SERPL DL<=0.005 MIU/L-ACNC: 3.73 UIU/ML (ref 0.27–4.2)
VLDLC SERPL CALC-MCNC: 20 MG/DL (ref 5–40)
WBC # BLD AUTO: 6.95 10*3/MM3 (ref 3.4–10.8)

## 2022-09-26 PROCEDURE — 99214 OFFICE O/P EST MOD 30 MIN: CPT | Performed by: NURSE PRACTITIONER

## 2022-09-26 RX ORDER — ESCITALOPRAM OXALATE 20 MG/1
20 TABLET ORAL DAILY
Qty: 90 TABLET | Refills: 3 | Status: SHIPPED | OUTPATIENT
Start: 2022-09-26

## 2022-09-26 NOTE — PROGRESS NOTES
"Subjective   Bethany Weaver is a 76 y.o. female.     History of Present Illness    Since the last visit, she has overall felt anxious.  She has Migraine headaches managed by their Neurologist anxiety and depression which she feels is not well controlled currently due to some family stress.  She would like to increase medication.  She also reports increased loose bowel movements 3 times per day which started around the same time.  she has been compliant with current medications have reviewed them.  The patient denies medication side effects.  Will refill medications. /80   Pulse 68   Temp 97.5 °F (36.4 °C)   Resp 16   Ht 170.2 cm (67\")   Wt 80.7 kg (178 lb)   SpO2 98%   BMI 27.88 kg/m²     Results for orders placed or performed in visit on 10/05/21   Basic metabolic panel    Specimen: Blood   Result Value Ref Range    Glucose CANCELED mg/dL    BUN 10 8 - 27 mg/dL    Creatinine 0.86 0.57 - 1.00 mg/dL    eGFR Non African Am 67 >59 mL/min/1.73    eGFR African Am 78 >59 mL/min/1.73    BUN/Creatinine Ratio 12 12 - 28    Sodium 143 134 - 144 mmol/L    Potassium CANCELED mmol/L    Chloride 103 96 - 106 mmol/L    Total CO2 21 20 - 29 mmol/L    Calcium 9.7 8.7 - 10.3 mg/dL   Vitamin B12    Specimen: Blood   Result Value Ref Range    Vitamin B-12 >2000 (H) 232 - 1245 pg/mL   Folate    Specimen: Blood   Result Value Ref Range    Folate 10.1 >3.0 ng/mL   CBC & Differential    Specimen: Blood   Result Value Ref Range    WBC 8.4 3.4 - 10.8 x10E3/uL    RBC 4.31 3.77 - 5.28 x10E6/uL    Hemoglobin 12.6 11.1 - 15.9 g/dL    Hematocrit 38.8 34.0 - 46.6 %    MCV 90 79 - 97 fL    MCH 29.2 26.6 - 33.0 pg    MCHC 32.5 31.5 - 35.7 g/dL    RDW 13.0 11.7 - 15.4 %    Platelets 597 (H) 150 - 450 x10E3/uL    Neutrophil Rel % 66 Not Estab. %    Lymphocyte Rel % 23 Not Estab. %    Monocyte Rel % 7 Not Estab. %    Eosinophil Rel % 3 Not Estab. %    Basophil Rel % 1 Not Estab. %    Neutrophils Absolute 5.6 1.4 - 7.0 x10E3/uL    " Lymphocytes Absolute 1.9 0.7 - 3.1 x10E3/uL    Monocytes Absolute 0.6 0.1 - 0.9 x10E3/uL    Eosinophils Absolute 0.2 0.0 - 0.4 x10E3/uL    Basophils Absolute 0.1 0.0 - 0.2 x10E3/uL    Immature Granulocyte Rel % 0 Not Estab. %    Immature Grans Absolute 0.0 0.0 - 0.1 x10E3/uL         The following portions of the patient's history were reviewed and updated as appropriate: allergies, current medications, past family history, past medical history, past social history, past surgical history and problem list.    Review of Systems   Constitutional: Negative for unexpected weight change.   Respiratory: Negative for shortness of breath.    Cardiovascular: Negative for chest pain and palpitations.   Gastrointestinal: Positive for diarrhea.   Psychiatric/Behavioral: Negative for behavioral problems. The patient is nervous/anxious.        Objective   Physical Exam  Vitals and nursing note reviewed.   Constitutional:       Appearance: Normal appearance. She is well-developed.   Neck:      Vascular: No carotid bruit.   Cardiovascular:      Rate and Rhythm: Normal rate and regular rhythm.   Pulmonary:      Effort: Pulmonary effort is normal.      Breath sounds: Normal breath sounds.   Neurological:      Mental Status: She is alert and oriented to person, place, and time.   Psychiatric:         Mood and Affect: Mood normal.         Behavior: Behavior normal.         Thought Content: Thought content normal.         Judgment: Judgment normal.         Assessment & Plan   Diagnoses and all orders for this visit:    1. Essential (primary) hypertension (Primary)  -     Comprehensive metabolic panel  -     Lipid panel  -     CBC and Differential  -     TSH  -     Vitamin D 25 Hydroxy    2. Mixed hyperlipidemia  -     Comprehensive metabolic panel  -     Lipid panel  -     CBC and Differential  -     TSH  -     Vitamin D 25 Hydroxy    3. Vitamin D deficiency  -     Comprehensive metabolic panel  -     Lipid panel  -     CBC and  Differential  -     TSH  -     Vitamin D 25 Hydroxy    4. Anxiety  -     escitalopram (LEXAPRO) 20 MG tablet; Take 1 tablet by mouth Daily.  Dispense: 90 tablet; Refill: 3

## 2022-11-10 ENCOUNTER — REFERRAL TRIAGE (OUTPATIENT)
Dept: CASE MANAGEMENT | Facility: OTHER | Age: 77
End: 2022-11-10

## 2022-11-10 ENCOUNTER — TELEPHONE (OUTPATIENT)
Dept: FAMILY MEDICINE CLINIC | Facility: CLINIC | Age: 77
End: 2022-11-10

## 2022-11-10 ENCOUNTER — TELEPHONE (OUTPATIENT)
Dept: CASE MANAGEMENT | Facility: OTHER | Age: 77
End: 2022-11-10

## 2022-11-10 DIAGNOSIS — F41.9 ANXIETY AND DEPRESSION: ICD-10-CM

## 2022-11-10 DIAGNOSIS — R41.0 DELIRIUM WITH DEMENTIA: Primary | ICD-10-CM

## 2022-11-10 DIAGNOSIS — F32.A ANXIETY AND DEPRESSION: ICD-10-CM

## 2022-11-10 DIAGNOSIS — I10 ESSENTIAL (PRIMARY) HYPERTENSION: ICD-10-CM

## 2022-11-17 ENCOUNTER — TELEPHONE (OUTPATIENT)
Dept: NEUROLOGY | Facility: CLINIC | Age: 77
End: 2022-11-17

## 2022-11-17 ENCOUNTER — PATIENT OUTREACH (OUTPATIENT)
Dept: CASE MANAGEMENT | Facility: OTHER | Age: 77
End: 2022-11-17

## 2022-11-17 NOTE — OUTREACH NOTE
AMBULATORY CASE MANAGEMENT NOTE    Name and Relationship of Patient/Support Person: Mirella Weaver - Emergency Contact    CCM Interim Update    Spoke with daughter Mirella (POA), discussed referral to [email or web address] - Agreed.  Faxed to alzheimer's association, Mariel Freeman to reach out to daughter.  Will f/u after SW outreach to discuss further needs.       Patient Outreach    Spoke with JOSIAH, Mirella Weaver.  Introduced self and purpose for call and RN-CCM program - Agreed to the program.      Discussed current neurological state with increased stressors d/t debt issues.  States patient dementia has had some recent behaviors as well as restless sleep, but things have improved and managing better for now.  Current management through Neurology and medication is Aricept.  Next appointment 11/23/22.    Reassured daughter, provided encouragement and [employer] contact information.  Reaching out to  to see if they may be able to assist with financial needs.  Will reach out to Alzheimer association also to request more information to share with patient.    F/u 11/23/22    Education Documentation  Provider Follow-Up, taught by Hellen Aldridge RN at 11/17/2022  9:00 AM.  Learner: Family  Readiness: Acceptance  Method: Explanation  Response: Verbalizes Understanding    Medication Management, taught by Hellen Aldridge RN at 11/17/2022  9:00 AM.  Learner: Family  Readiness: Acceptance  Method: Explanation  Response: Verbalizes Understanding    Home Safety, taught by Hellen Aldridge RN at 11/17/2022  9:00 AM.  Learner: Family  Readiness: Acceptance  Method: Explanation  Response: Verbalizes Understanding    Coping with Confusion, taught by Hellen Aldridge RN at 11/17/2022  9:00 AM.  Learner: Family  Readiness: Acceptance  Method: Explanation  Response: Verbalizes Understanding    Signs/Symptoms, taught by Hellen Aldridge RN at 11/17/2022  9:00 AM.  Learner: Family  Readiness: Acceptance  Method: Explanation  Response:  Verbalizes Understanding          JOSTIN NORWOOD  Ambulatory Case Management    11/17/2022, 09:01 EST

## 2022-11-17 NOTE — TELEPHONE ENCOUNTER
Patient JOSIAH Vu called and states that she needs to know how to prevent people getting in to the chart and changing appointment.  Mirella needed to go and call back after school she was busy since kids were coming in.  She will call back

## 2022-11-21 ENCOUNTER — PATIENT OUTREACH (OUTPATIENT)
Dept: CASE MANAGEMENT | Facility: OTHER | Age: 77
End: 2022-11-21

## 2022-11-21 NOTE — OUTREACH NOTE
Social Work Assessment  Questions/Answers    Flowsheet Row Most Recent Value   Referral Source nursing   Reason for Consult community resources, financial concerns  [prepared meals]   Preferred Language English   Advance Care Planning Reviewed present on chart   People in Home alone   Current Living Arrangements home   Primary Care Provided by self   Provides Primary Care For no one, unable/limited ability to care for self   Family Caregiver if Needed child(tisha), adult   Quality of Family Relationships helpful, supportive   Source of Income social security   Financial/Environmental Concerns other (see comments)   Application for Public Assistance not applied   Usual Activity Tolerance fair   Current Activity Tolerance fair        SDOH updated and reviewed with the patient during this program:  Financial Resource Strain: Low Risk    • Difficulty of Paying Living Expenses: Not hard at all      Food Insecurity: No Food Insecurity   • Worried About Running Out of Food in the Last Year: Never true   • Ran Out of Food in the Last Year: Never true      Transportation Needs: No Transportation Needs   • Lack of Transportation (Medical): No   • Lack of Transportation (Non-Medical): No      Housing Stability: Low Risk    • Unable to Pay for Housing in the Last Year: No   • Number of Places Lived in the Last Year: 1   • Unstable Housing in the Last Year: No          Patient Outreach    MSW outreach to patient's daughter Mirella to discuss community resources available to patient. Patient's daughter Mirella states that there has been debt accrued in her mother's name while sister was power of . Patient's daughter has filed a police report and is working with proper authorities. Patient's daughter states that she has filed a new POA for her mother, they are working out the details at this time and no further assistance needed. Patient's daughter states she is interested in prepared meals for her mother and MSW provided  information regarding Meals on Wheels, Mom's Meals, and [facility]. Daughter will review these options to see which is the best fit for her mother. No additional needs at this time. Patient's daughter to follow-up with MSW as needed.     ZAINAB ESTEBAN -   Ambulatory Case Management    11/21/2022, 12:57 EST

## 2022-12-23 RX ORDER — DONEPEZIL HYDROCHLORIDE 10 MG/1
TABLET, FILM COATED ORAL
Qty: 30 TABLET | Refills: 2 | Status: SHIPPED | OUTPATIENT
Start: 2022-12-23

## 2022-12-23 NOTE — TELEPHONE ENCOUNTER
PT DAUGHTER CALLING PT WILL BE OUT OF HER RX IN TWO DAYS THIS WAS SENT OVER ON 12.20.22 WITH NO RESPONSE.  NEEDS FILLED ASAP    PRECIOUS PT DAUGHTER WOULD LIKE A CALL TO LET HER KNOW THIS WAS CALLED IN     PRECIOUS. #[phone] CAN LEAVE A DETAILED MESS IF GET VM      PLEASE ADVISE     [id number] [place], [place] [address] [place] [facility] [place] [address] [facility] - [phone] [facility] [phone]   [phone]

## 2023-02-08 ENCOUNTER — OFFICE VISIT (OUTPATIENT)
Dept: NEUROLOGY | Facility: CLINIC | Age: 78
End: 2023-02-08
Payer: MEDICARE

## 2023-02-08 VITALS
HEIGHT: 67 IN | WEIGHT: 184 LBS | OXYGEN SATURATION: 97 % | HEART RATE: 67 BPM | BODY MASS INDEX: 28.88 KG/M2 | SYSTOLIC BLOOD PRESSURE: 130 MMHG | DIASTOLIC BLOOD PRESSURE: 76 MMHG

## 2023-02-08 DIAGNOSIS — F41.9 ANXIETY AND DEPRESSION: ICD-10-CM

## 2023-02-08 DIAGNOSIS — F32.A ANXIETY AND DEPRESSION: ICD-10-CM

## 2023-02-08 DIAGNOSIS — F03.90 DEMENTIA WITHOUT BEHAVIORAL DISTURBANCE: Primary | ICD-10-CM

## 2023-02-08 PROCEDURE — 99215 OFFICE O/P EST HI 40 MIN: CPT | Performed by: NURSE PRACTITIONER

## 2023-02-08 RX ORDER — DONEPEZIL HYDROCHLORIDE 10 MG/1
10 TABLET, FILM COATED ORAL NIGHTLY
COMMUNITY

## 2023-02-08 RX ORDER — MEMANTINE HYDROCHLORIDE 5 MG/1
TABLET ORAL
Qty: 120 TABLET | Refills: 0 | Status: SHIPPED | OUTPATIENT
Start: 2023-02-08 | End: 2023-03-27 | Stop reason: CLARIF

## 2023-02-08 NOTE — PROGRESS NOTES
DOS: 2023  NAME: Bethany Weaver   : 1945  PCP: Shameka Rodriguez APRN    Chief Complaint   Patient presents with   • Dementia      SUBJECTIVE  Neurological Problem:  77 y.o. LHW female with osteoarthritis, osteopenia, allergies, parathyroid, depression, h/o knee replacement. Patient presents today for follow-up accompanied by her sister and daughter.  Patient and problem are new to examiner. History is provided by patient, family and review of records that are summarized below. (Mirella is POA, Leann sister is co-POA).     Interval History:   Ms. Weaver was initially evaluated by Dr. Easton for cognitive impairment, memory problems, she was last seen by Joana Horan in 2022.  Review of records indicate her cognitive issues started in early .  It was reported she had word finding difficulties, misplacing things, difficulty remembering her age and her children's birthday.  She was a , spouse passed away in .  She no longer drives.  At her last visit patient was reported to be able to manage her household, lives alone with her dog, able to grocery shop someone drives her, no problems with personal hygiene, daughter has managed finances since spouse passed away.  Family was helping with medication management.  She was started on Aricept, titrated to 10 mg daily. MMSE noted on 2022 was 20.     Patient and family present today, corroborate history as noted above.  No significant changes cognitively, continues to live in her home independently with her dog Dee, daily visits from family, she does cook for herself, uses a stove, is independent of ADLS but needs some reminders for personal hygiene, family manages finances and helps with medications.  Family does mention that she has been having some nightmares, some disturbing dreams.  Patient herself denies this today.  She denies having any kinds of concerning dreams, no hallucinations.  She does take Lexapro 20 mg daily.   "She does not typically have someone stay overnight regularly; however, she did have recent overnight at her daughter's house, there were no nightmares at that time.  Another observation made by family is some \"tic-like \" behavior involving the clicking of her tongue.  She has reported some dry mouth and does not drink much water, unclear if this is contributing.  Otherwise has been no real change in personality or behavior.  No interval development of tremor.  No changes in ambulation, no falls.  She does not use any assistive device.     Family history of stroke in mother, also some suspicions of dementia in mother as well; father with heart disease.    Review of Systems:Review of Systems   Constitutional: Positive for fatigue. Negative for chills and fever.   Endocrine: Negative for cold intolerance, heat intolerance and polydipsia.   Genitourinary: Negative for decreased urine volume, difficulty urinating and urgency.   Musculoskeletal: Negative for back pain, gait problem, neck pain and neck stiffness.   Skin: Negative for color change, rash and wound.   Allergic/Immunologic: Negative for environmental allergies, food allergies and immunocompromised state.   Neurological: Positive for weakness (sometimes). Negative for dizziness, tremors, seizures, syncope, facial asymmetry, speech difficulty, light-headedness, numbness and headaches.   Hematological: Negative for adenopathy. Bruises/bleeds easily.   Psychiatric/Behavioral: Positive for confusion, decreased concentration, hallucinations (?) and sleep disturbance (dreams). Negative for agitation (at times). The patient is not nervous/anxious.     Above ROS reviewed    The following portions of the patient's history were reviewed and updated as appropriate: allergies, current medications, past family history, past medical history, past social history, past surgical history and problem list.    Current Medications:   Current Outpatient Medications:   •  donepezil " (ARICEPT) 10 MG tablet, Take 10 mg by mouth Every Night., Disp: , Rfl:   •  escitalopram (LEXAPRO) 20 MG tablet, Take 1 tablet by mouth Daily., Disp: 90 tablet, Rfl: 3  •  Loratadine (CLARITIN PO), Take  by mouth., Disp: , Rfl:   •  vitamin D3 125 MCG (5000 UT) capsule capsule, Take 1 capsule by mouth Daily. HOLD DOS, Disp: 30 capsule, Rfl: 5  •  aspirin  MG tablet, Take 1 tablet by mouth Daily. Per patient does not take all the time, Disp: 90 tablet, Rfl: 1  •  Cyanocobalamin (B-12) 3000 MCG sublingual tablet, Place  under the tongue., Disp: , Rfl:   •  donepezil (ARICEPT) 10 MG tablet, TAKE ONE TABLET BY MOUTH ONCE NIGHTLY (Patient taking differently: Patient still taking), Disp: 30 tablet, Rfl: 2  •  memantine (NAMENDA) 5 MG tablet, 1 tab daily for 1 week, then 1 tab twice daily for 1 week, then 2 tabs in the morning and 1 tab at night for 1 week, then 2 tabs twice daily, Disp: 120 tablet, Rfl: 0  **I did not stop or change the above medications.  Patient's medication list was updated to reflect medications they have reported as currently taking, including medication changes made by other providers.    OBJECTIVE  Vitals:    02/08/23 0754   BP: 130/76   Pulse: 67   SpO2: 97%     Body mass index is 28.82 kg/m².    Diagnostics:    Laboratory Results:         Lab Results   Component Value Date    WBC 6.95 09/26/2022    HGB 14.5 09/26/2022    HCT 43.7 09/26/2022    MCV 87.2 09/26/2022     09/26/2022     Lab Results   Component Value Date    GLUCOSE 73 09/26/2022    BUN 14 09/26/2022    CREATININE 0.91 09/26/2022    EGFRIFNONA 67 10/05/2021    EGFRIFAFRI 78 10/05/2021    BCR 15.4 09/26/2022    K 5.1 09/26/2022    CO2 29.2 (H) 09/26/2022    CALCIUM 9.8 09/26/2022    PROTENTOTREF 6.8 09/26/2022    ALBUMIN 4.50 09/26/2022    LABIL2 2.0 09/26/2022    AST 14 09/26/2022    ALT 13 09/26/2022     Lab Results   Component Value Date    HGBA1C 5.50 03/12/2018     No results found for: CHOL  Lab Results   Component  Value Date    HDL 78 (H) 09/26/2022    HDL 76 (H) 06/08/2020    HDL 98 (H) 03/12/2018     Lab Results   Component Value Date     (H) 09/26/2022     (H) 06/08/2020     (H) 03/12/2018     Lab Results   Component Value Date    TRIG 116 09/26/2022    TRIG 107 06/08/2020    TRIG 96 03/12/2018     No results found for: RPR  Lab Results   Component Value Date    TSH 3.730 09/26/2022     Lab Results   Component Value Date    XASIXFFA71 >2000 (H) 10/05/2021       Physical Exam:  GENERAL: NAD  HEENT: Normocephalic, atraumatic   COR: RRR  Resp: Even and unlabored  Extremities: No signs of distal embolization.   Skin: No rashes, lesions or ulcers.  Psychiatric: Normal mood and affect.    Neurological:   MS: Alert. Oriented to self, family. Difficulty with date, location. Naming ane repetition intact.  Difficulty with simple arithmetic no neglect. Follows all commands.  CN: II-XII grossly normal except for decreased auditory acuity.  Motor: Normal strength and tone throughout.  Sensory: Intact to light touch in arms and legs  Station and Gait: Slow, cautious, otherwise normal.  Not using any assistive device.   Coordination: Normal finger to nose bilaterally    Impression/Plan:  Ms. Weaver was initially evaluated by Dr. Eatson, more recently by Joana Horan for history of dementia.  She has not had any formal neuropsych testing.      She is tolerating Aricept 10 mg and from history symptoms appear to be stable.  There is some mention of nightmares and some agitation regarding this that patient herself currently denies.  Had a long discussion with family today regarding BBW on antipsychotic medications such as Seroquel when used in the elderly with dementia.  We also reviewed alternatives such as ensuring night versus day activity; increased activity during the day, regular physical activity, mental stimulation; ensuring adequate hydration; likelihood of needing increased supervision.  We discussed the  addition of memantine, reviewed, indication, instructions on use, risk, benefits, possible side effects.  Family is interested in this and will therefore will initiate today, titration schedule reviewed.  They will follow-up in 6 months, likely with Dr. Easton, they will call sooner if symptoms warrant.    I spent a total of 45 minutes today in reviewing records, prior diagnostics, examination of patient as well as counseling and educating patient regarding diagnoses, symptoms, reviewing diagnostics with patient, pharmacologic treatment options including purpose, risk, benefits, possible side-effects, recommendations, lifestyle modifications, coordination of care and documenting plan of care.          There are no diagnoses linked to this encounter.    Coding      Dictated using Dragon

## 2023-02-09 ENCOUNTER — TELEPHONE (OUTPATIENT)
Dept: CASE MANAGEMENT | Facility: OTHER | Age: 78
End: 2023-02-09
Payer: MEDICARE

## 2023-02-09 NOTE — TELEPHONE ENCOUNTER
Chart reviewed. Pt had follow up with neurology yesterday 2/8/23. Noted pt has not had a formal neuro-psych test. Contacted Dr. Easton's office - they do not do that there but given two places that do: [facility] [phone] and [facility] [phone].     Call placed to daughter to discuss possible referral needs. [facility].     F/up:  Namenda added 2/8/23  Neuro-psych eval?  Meal delivery?  ALZ referral completed

## 2023-02-10 ENCOUNTER — TELEPHONE (OUTPATIENT)
Dept: NEUROLOGY | Facility: CLINIC | Age: 78
End: 2023-02-10
Payer: MEDICARE

## 2023-02-24 ENCOUNTER — TELEPHONE (OUTPATIENT)
Dept: CASE MANAGEMENT | Facility: OTHER | Age: 78
End: 2023-02-24
Payer: MEDICARE

## 2023-03-27 ENCOUNTER — TELEPHONE (OUTPATIENT)
Dept: NEUROLOGY | Facility: CLINIC | Age: 78
End: 2023-03-27
Payer: MEDICARE

## 2023-03-27 RX ORDER — MEMANTINE HYDROCHLORIDE 10 MG/1
10 TABLET ORAL DAILY
Qty: 180 TABLET | Refills: 3 | Status: SHIPPED | OUTPATIENT
Start: 2023-03-27 | End: 2024-03-26

## 2023-03-27 NOTE — TELEPHONE ENCOUNTER
SPOKE WITH PATIENT'S DAUGHTER AND CONFIRMED SHE HAS COMPLETED THE TITRATION SCHEDULE    NEW SCRIPT FOR MEMANTINE 10 MG BID WILL BE SENT TO PHARM- PT DAUGHTER INFORMED

## 2023-03-29 ENCOUNTER — TELEPHONE (OUTPATIENT)
Dept: CASE MANAGEMENT | Facility: OTHER | Age: 78
End: 2023-03-29

## 2023-03-30 NOTE — HOME HEALTH
PT DC OASIS completed today as pt is no longer homebound and ambulating without a device. She will benefit from OP PT for continued knee rehab.  Her son in law verbalized understanding.   Plan to set up pt with OP PT at [facility] on [address]. Called there and they did not have the referral.  Therefore left a message with Kelley Cole. Hydroxychloroquine Counseling:  I discussed with the patient that a baseline ophthalmologic exam is needed at the start of therapy and every year thereafter while on therapy. A CBC may also be warranted for monitoring.  The side effects of this medication were discussed with the patient, including but not limited to agranulocytosis, aplastic anemia, seizures, rashes, retinopathy, and liver toxicity. Patient instructed to call the office should any adverse effect occur.  The patient verbalized understanding of the proper use and possible adverse effects of Plaquenil.  All the patient's questions and concerns were addressed.

## 2023-04-18 ENCOUNTER — TELEPHONE (OUTPATIENT)
Dept: NEUROLOGY | Facility: CLINIC | Age: 78
End: 2023-04-18
Payer: MEDICARE

## 2023-04-18 NOTE — TELEPHONE ENCOUNTER
Which medication are they referring to?  Looks like she was tolerating aricept in February at her last visit.

## 2023-04-18 NOTE — TELEPHONE ENCOUNTER
Caller: Mirella Weaver    Relationship: Emergency Contact    Best call back number: [phone]    What medications are you currently taking:   Current Outpatient Medications on File Prior to Visit   Medication Sig Dispense Refill   • aspirin  MG tablet Take 1 tablet by mouth Daily. Per patient does not take all the time 90 tablet 1   • Cyanocobalamin (B-12) 3000 MCG sublingual tablet Place  under the tongue.     • donepezil (ARICEPT) 10 MG tablet TAKE ONE TABLET BY MOUTH ONCE NIGHTLY (Patient taking differently: Patient still taking) 30 tablet 2   • donepezil (ARICEPT) 10 MG tablet Take 10 mg by mouth Every Night.     • escitalopram (LEXAPRO) 20 MG tablet Take 1 tablet by mouth Daily. 90 tablet 3   • Loratadine (CLARITIN PO) Take  by mouth.     • memantine (NAMENDA) 10 MG tablet Take 1 tablet by mouth Daily. 180 tablet 3   • vitamin D3 125 MCG (5000 UT) capsule capsule Take 1 capsule by mouth Daily. HOLD DOS 30 capsule 5     No current facility-administered medications on file prior to visit.          [place]    Which medication are you concerned about: MEMANTINE    Who prescribed you this medication: DAPHNEY BARBER    What are your concerns: PATIENT HAS BEEN TAKING MEDICATION DAILY FOR ABOUT 2-3 WEEKS NOW AND IT HAS BEEN CAUSING HER SEVERE HEADACHES TO WHERE PATIENT CAN'T FUNCTION. WOULD LIKE ADVICE ON STOPPING MEDICATION OR CUTTING IT DOWN. WHEN PATIENT WAS TITRATING MEDICATION SHE DID NOT HAVE ANY SIDE EFFECTS.    How long have you had these concerns: A FEW WEEKS

## 2023-04-20 ENCOUNTER — TELEPHONE (OUTPATIENT)
Dept: NEUROLOGY | Facility: CLINIC | Age: 78
End: 2023-04-20
Payer: MEDICARE

## 2023-04-20 NOTE — TELEPHONE ENCOUNTER
Caller: Mirella Weaver    Relationship: Emergency Contact    Best call back number: [phone]    Requested Prescriptions:   Donepezil 10 mg - 1 pill nightly     Pharmacy where request should be sent: [facility] [id number] [facility] [address] [facility] [address] [facility] [phone] [facility] [phone] FX     Last office visit with prescribing clinician: 10/25/2021   Last telemedicine visit with prescribing clinician: Visit date not found   Next office visit with prescribing clinician: 8/1/2023     Additional details provided by patient: PATIENT HAS NONE REMAINING. PLEASE SEND 90 DAY REFILL TO [facility].    Does the patient have less than a 3 day supply:  [x] Yes  [] No    Would you like a call back once the refill request has been completed: [x] Yes [] No    If the office needs to give you a call back, can they leave a voicemail: [x] Yes [] No    Stanford Saavedra Rep   04/20/23 16:29 EDT

## 2023-04-21 RX ORDER — DONEPEZIL HYDROCHLORIDE 10 MG/1
10 TABLET, FILM COATED ORAL NIGHTLY
Qty: 30 TABLET | Refills: 2 | Status: SHIPPED | OUTPATIENT
Start: 2023-04-21

## 2023-07-24 RX ORDER — DONEPEZIL HYDROCHLORIDE 10 MG/1
TABLET, FILM COATED ORAL
Qty: 30 TABLET | Refills: 2 | Status: SHIPPED | OUTPATIENT
Start: 2023-07-24

## 2023-08-01 ENCOUNTER — OFFICE VISIT (OUTPATIENT)
Dept: NEUROLOGY | Facility: CLINIC | Age: 78
End: 2023-08-01
Payer: MEDICARE

## 2023-08-01 VITALS
DIASTOLIC BLOOD PRESSURE: 74 MMHG | OXYGEN SATURATION: 97 % | BODY MASS INDEX: 28.41 KG/M2 | HEIGHT: 67 IN | SYSTOLIC BLOOD PRESSURE: 124 MMHG | HEART RATE: 72 BPM | WEIGHT: 181 LBS

## 2023-08-01 DIAGNOSIS — F03.90 DEMENTIA WITHOUT BEHAVIORAL DISTURBANCE: Primary | ICD-10-CM

## 2023-08-01 RX ORDER — MULTIPLE VITAMINS W/ MINERALS TAB 9MG-400MCG
1 TAB ORAL DAILY
COMMUNITY

## 2023-10-27 RX ORDER — DONEPEZIL HYDROCHLORIDE 10 MG/1
10 TABLET, FILM COATED ORAL NIGHTLY
Qty: 30 TABLET | Refills: 2 | Status: SHIPPED | OUTPATIENT
Start: 2023-10-27

## 2023-10-31 DIAGNOSIS — F41.9 ANXIETY: ICD-10-CM

## 2023-11-02 RX ORDER — ESCITALOPRAM OXALATE 20 MG/1
20 TABLET ORAL DAILY
Qty: 30 TABLET | Refills: 0 | Status: SHIPPED | OUTPATIENT
Start: 2023-11-02

## 2023-12-21 ENCOUNTER — OFFICE VISIT (OUTPATIENT)
Dept: FAMILY MEDICINE CLINIC | Facility: CLINIC | Age: 78
End: 2023-12-21
Payer: MEDICARE

## 2023-12-21 VITALS
DIASTOLIC BLOOD PRESSURE: 81 MMHG | RESPIRATION RATE: 16 BRPM | HEART RATE: 79 BPM | HEIGHT: 68 IN | SYSTOLIC BLOOD PRESSURE: 170 MMHG | OXYGEN SATURATION: 96 % | WEIGHT: 184 LBS | TEMPERATURE: 97.5 F | BODY MASS INDEX: 27.89 KG/M2

## 2023-12-21 DIAGNOSIS — F41.9 ANXIETY AND DEPRESSION: Primary | ICD-10-CM

## 2023-12-21 DIAGNOSIS — F32.A ANXIETY AND DEPRESSION: Primary | ICD-10-CM

## 2023-12-21 PROCEDURE — 3077F SYST BP >= 140 MM HG: CPT | Performed by: FAMILY MEDICINE

## 2023-12-21 PROCEDURE — 99213 OFFICE O/P EST LOW 20 MIN: CPT | Performed by: FAMILY MEDICINE

## 2023-12-21 PROCEDURE — 3079F DIAST BP 80-89 MM HG: CPT | Performed by: FAMILY MEDICINE

## 2023-12-21 RX ORDER — ESCITALOPRAM OXALATE 20 MG/1
20 TABLET ORAL DAILY
Qty: 90 TABLET | Refills: 1 | Status: SHIPPED | OUTPATIENT
Start: 2023-12-21 | End: 2023-12-21 | Stop reason: SDUPTHER

## 2023-12-21 RX ORDER — ESCITALOPRAM OXALATE 20 MG/1
20 TABLET ORAL DAILY
Qty: 90 TABLET | Refills: 1 | Status: SHIPPED | OUTPATIENT
Start: 2023-12-21 | End: 2024-06-18

## 2023-12-21 NOTE — PROGRESS NOTES
"Chief Complaint  Depression and Med Refill    Subjective        HPI   Bethany presents to [facility] for refill of medications.  She has been on escitalopram for a while for her depression.  That seems to be fairly well-controlled.  She is also treated for memory disorder with donepezil.  That condition is being cared for by specialty.  Patient has no sense that memory is worsening but there may be some issues of forgetfulness still with the medicine.  Patient does have a  at the home and her daughter lives nearby.  Her daughter Mirella is with the patient during today's exam and provides some history.          Objective   Vital Signs:   Vitals:    12/21/23 1539   BP: 170/81   BP Location: Left arm   Patient Position: Sitting   Pulse: 79   Resp: 16   Temp: 97.5 °F (36.4 °C)   TempSrc: Oral   SpO2: 96%   Weight: 83.5 kg (184 lb)   Height: 172.7 cm (68\")            12/21/2023     3:44 PM   PHQ-2/PHQ-9 Depression Screening   Little Interest or Pleasure in Doing Things 1-->several days   Feeling Down, Depressed or Hopeless 1-->several days   Trouble Falling or Staying Asleep, or Sleeping Too Much 2-->more than half the days   Feeling Tired or Having Little Energy 1-->several days   Poor Appetite or Overeating 0-->not at all   Feeling Bad about Yourself - or that You are a Failure or Have Let Yourself or Your Family Down 1-->several days   Trouble Concentrating on Things, Such as Reading the Newspaper or Watching Television 0-->not at all   Moving or Speaking So Slowly that Other People Could Have Noticed? Or the Opposite - Being So Fidgety 3-->nearly every day   Thoughts that You Would be Better Off Dead or of Hurting Yourself in Some Way 0-->not at all   PHQ-9: Brief Depression Severity Measure Score 9   If You Checked Off Any Problems, How Difficult Have These Problems Made It For You to Do Your Work, Take Care of Things at Home, or Get Along with Other People? not " difficult at all               Physical Exam  Constitutional:       General: She is not in acute distress.  Neurological:      Mental Status: She is alert and oriented to person, place, and time.   Psychiatric:         Attention and Perception: Attention normal. She is attentive.         Mood and Affect: Mood normal. Mood is not anxious.         Speech: Speech normal.         Behavior: Behavior normal.         Thought Content: Thought content normal.         Cognition and Memory: Memory is impaired.         Judgment: Judgment normal.          Result Review :                Assessment and Plan    Assessment & Plan  Anxiety and depression  (new problem to this provider)  Condition stable.  Continue with escitalopram.  Follow-up with her primary care provider in 6 months.     New Medications Ordered This Visit   Medications    escitalopram (LEXAPRO) 20 MG tablet     Sig: Take 1 tablet by mouth Daily for 180 days.     Dispense:  90 tablet     Refill:  1          Follow Up   Return in about 6 months (around 6/21/2024).  Patient was given instructions and counseling regarding her condition or for health maintenance advice. Please see specific information pulled into the AVS if appropriate.

## 2023-12-21 NOTE — ASSESSMENT & PLAN NOTE
(new problem to this provider)  Condition stable.  Continue with escitalopram.  Follow-up with her primary care provider in 6 months.

## 2024-01-26 ENCOUNTER — TELEPHONE (OUTPATIENT)
Dept: NEUROLOGY | Facility: CLINIC | Age: 79
End: 2024-01-26

## 2024-01-26 NOTE — TELEPHONE ENCOUNTER
Caller: Mirella Weaver    Relationship to patient: Emergency Contact    Best call back number: [phone]    Chief complaint: DEMENTIA    Type of visit: FOLLOW UP    Requested date: ANY AFTER NEXT WEEK     If rescheduling, when is the original appointment: 1/31/24     Additional notes: WILL BE OUT TOWN 1/29/24-2/2/24

## 2024-01-29 NOTE — TELEPHONE ENCOUNTER
PATIENTS DAUGHTER CALLED; THEY WILL TRANSFER TO DR FRAGA.    APPT SCHEDULED BY [facility].

## 2024-02-02 RX ORDER — DONEPEZIL HYDROCHLORIDE 10 MG/1
10 TABLET, FILM COATED ORAL NIGHTLY
Qty: 30 TABLET | Refills: 2 | Status: SHIPPED | OUTPATIENT
Start: 2024-02-02

## 2024-06-17 ENCOUNTER — TELEPHONE (OUTPATIENT)
Dept: NEUROLOGY | Facility: CLINIC | Age: 79
End: 2024-06-17
Payer: MEDICARE

## 2024-06-17 NOTE — TELEPHONE ENCOUNTER
LVM, Sent [employer] Message (If Applicable), and sent mail reminder regarding new appt due to provider being out of office of original appointment. Scheduled new appt for August 7th at 8:00AM

## 2024-08-07 ENCOUNTER — OFFICE VISIT (OUTPATIENT)
Dept: NEUROLOGY | Facility: CLINIC | Age: 79
End: 2024-08-07
Payer: MEDICARE

## 2024-08-07 ENCOUNTER — LAB (OUTPATIENT)
Dept: LAB | Facility: HOSPITAL | Age: 79
End: 2024-08-07
Payer: MEDICARE

## 2024-08-07 VITALS
SYSTOLIC BLOOD PRESSURE: 138 MMHG | HEIGHT: 68 IN | OXYGEN SATURATION: 96 % | HEART RATE: 86 BPM | BODY MASS INDEX: 27.13 KG/M2 | DIASTOLIC BLOOD PRESSURE: 74 MMHG | WEIGHT: 179 LBS

## 2024-08-07 DIAGNOSIS — R41.3 MEMORY LOSS: Primary | ICD-10-CM

## 2024-08-07 DIAGNOSIS — R41.3 MEMORY LOSS: ICD-10-CM

## 2024-08-07 LAB
FOLATE SERPL-MCNC: 19.99 NG/ML (ref 4.78–24.2)
VIT B12 BLD-MCNC: 379 PG/ML (ref 211–946)

## 2024-08-07 PROCEDURE — 36415 COLL VENOUS BLD VENIPUNCTURE: CPT

## 2024-08-07 PROCEDURE — 82746 ASSAY OF FOLIC ACID SERUM: CPT

## 2024-08-07 PROCEDURE — 82607 VITAMIN B-12: CPT

## 2024-08-07 RX ORDER — MEMANTINE HYDROCHLORIDE 5 MG/1
5 TABLET ORAL 2 TIMES DAILY
Qty: 180 TABLET | Refills: 1 | Status: SHIPPED | OUTPATIENT
Start: 2024-08-07 | End: 2025-08-07

## 2024-08-07 RX ORDER — DONEPEZIL HYDROCHLORIDE 10 MG/1
10 TABLET, FILM COATED ORAL DAILY
Qty: 90 TABLET | Refills: 2 | Status: SHIPPED | OUTPATIENT
Start: 2024-08-07

## 2024-08-07 NOTE — PROGRESS NOTES
Chief Complaint   Patient presents with    Dementia       Patient ID: Bethany Weaver is a 78 y.o. female.    HPI:    The following portions of the patient's history were reviewed and updated as appropriate: allergies, current medications, past family history, past medical history, past social history, past surgical history and problem list.    Interval history:    Review of Systems   Constitutional:  Positive for fatigue.   Neurological:  Negative for dizziness, tremors, seizures, syncope, facial asymmetry, speech difficulty, weakness, light-headedness, numbness and headaches.   Psychiatric/Behavioral:  Positive for confusion (worse), decreased concentration and sleep disturbance.       Ms. Weaver is a 78-year-old female who presents to neurology clinic for follow-up of memory loss previously seen by Dr. Catherine Easton and Coretta.  She presents today with her daughter and power of .  Her daughter has moved in in February 2024 and continues to have caregiver services during the day.  The patient has been off of Aricept for 1 to 2 months due to prescription issues.  She has had no falls.  She is not sleeping as well she wakes up early in the middle of the night and gets ready for the day.  She continues to need reminders for hygiene.  She has trouble with orientation and short-term memory and attention.  This is gotten worse since her last appointment.  She did have benefits from lower doses of memantine per her daughter but had severe headaches when she was at the full dose of 10 mg twice a day of memantine.  Physically she is not ambulating as far and transferring wise she can transfer with things and hand that make it a little bit more dangerous for her to transfer.  She started dressing the wrong way like wearing sweaters during the summer and putting on the wrong type of clothing in the wrong location.  She has had some apraxia spreading peanut butter on a plate instead of on a piece of bread.  She  is lost some weight intentionally trying to limit sugar.  Their plan is to keep her as independently as possible at home for the time being.  They are aware of senior home transitions.  She is not taking B12.      Vitals:    08/07/24 0751   BP: 138/74   Pulse: 86   SpO2: 96%       Neurologic Exam     Mental Status   MMSE 8/30       Physical Exam    Procedures    Assessment/Plan:    The patient has moderate to severe dementia.  Based on testing and clinical history.  She had some benefit from memantine in the past without side effects at a lower dosage so we will keep her at 5 mg twice a day and also resume donepezil 10 mg daily for now.  We discussed the new Alzheimer's medicine she is not a candidate for given her current state of memory issues.  We will check B12 and folate to look for treatable causes of memory issues that could also be contributing factor.    I spent 50 minutes caring for this patient on this date of service. This time includes time spent by me in the following activities as necessary: preparing for the visit, reviewing tests, medical records and previous visits, obtaining and/or reviewing a separately obtained history, performing a medically appropriate exam and/or evaluation, counseling and educating the patient, and/or communicating with other healthcare professionals, documenting information in the medical record, independently interpreting results and communicating that information with the patient, and developing a medically appropriate treatment plan with consideration of other conditions, medications, and treatments.       Diagnoses and all orders for this visit:    1. Memory loss (Primary)  -     Vitamin B12; Future  -     Folate; Future    Other orders  -     donepezil (ARICEPT) 10 MG tablet; Take 1 tablet by mouth Daily.  Dispense: 90 tablet; Refill: 2  -     memantine (NAMENDA) 5 MG tablet; Take 1 tablet by mouth 2 (Two) Times a Day.  Dispense: 180 tablet; Refill: 1           Marck  MD Wood

## 2024-10-17 NOTE — NURSING NOTE
Spoke with daughter Mona.  She plans to be here tomorrow to see how her Mom does with PT. If current state (heavy 2 person assist) continues she is agreeable to patient going to [facility]. If patient discharges home a BSC will be needed.    Password for telephone communication was setup (BABAR).      no Yes

## 2024-11-18 DIAGNOSIS — F41.9 ANXIETY AND DEPRESSION: ICD-10-CM

## 2024-11-18 DIAGNOSIS — F32.A ANXIETY AND DEPRESSION: ICD-10-CM

## 2024-11-18 NOTE — TELEPHONE ENCOUNTER
Last visit: 12-21-23  Next visit: rtc 6 months    Sent pt msg stating they are way overdue for appt and would request 30 day supply of meds

## 2024-11-19 RX ORDER — ESCITALOPRAM OXALATE 20 MG/1
20 TABLET ORAL DAILY
Qty: 30 TABLET | Refills: 0 | Status: SHIPPED | OUTPATIENT
Start: 2024-11-19 | End: 2025-05-18

## 2024-12-09 ENCOUNTER — TELEPHONE (OUTPATIENT)
Dept: FAMILY MEDICINE CLINIC | Facility: CLINIC | Age: 79
End: 2024-12-09

## 2024-12-09 ENCOUNTER — OFFICE VISIT (OUTPATIENT)
Dept: FAMILY MEDICINE CLINIC | Facility: CLINIC | Age: 79
End: 2024-12-09
Payer: MEDICARE

## 2024-12-09 VITALS
WEIGHT: 176 LBS | HEIGHT: 68 IN | SYSTOLIC BLOOD PRESSURE: 140 MMHG | BODY MASS INDEX: 26.67 KG/M2 | HEART RATE: 79 BPM | DIASTOLIC BLOOD PRESSURE: 92 MMHG | OXYGEN SATURATION: 97 %

## 2024-12-09 DIAGNOSIS — F05 DELIRIUM WITH DEMENTIA: ICD-10-CM

## 2024-12-09 DIAGNOSIS — E55.9 VITAMIN D DEFICIENCY: ICD-10-CM

## 2024-12-09 DIAGNOSIS — E78.2 MIXED HYPERLIPIDEMIA: ICD-10-CM

## 2024-12-09 DIAGNOSIS — F32.A ANXIETY AND DEPRESSION: Primary | ICD-10-CM

## 2024-12-09 DIAGNOSIS — F03.90 DELIRIUM WITH DEMENTIA: ICD-10-CM

## 2024-12-09 DIAGNOSIS — J18.9 PNEUMONIA OF LEFT LOWER LOBE DUE TO INFECTIOUS ORGANISM: ICD-10-CM

## 2024-12-09 DIAGNOSIS — I10 ESSENTIAL (PRIMARY) HYPERTENSION: ICD-10-CM

## 2024-12-09 DIAGNOSIS — F41.9 ANXIETY AND DEPRESSION: Primary | ICD-10-CM

## 2024-12-09 PROCEDURE — 99214 OFFICE O/P EST MOD 30 MIN: CPT | Performed by: NURSE PRACTITIONER

## 2024-12-09 PROCEDURE — 3077F SYST BP >= 140 MM HG: CPT | Performed by: NURSE PRACTITIONER

## 2024-12-09 PROCEDURE — 1160F RVW MEDS BY RX/DR IN RCRD: CPT | Performed by: NURSE PRACTITIONER

## 2024-12-09 PROCEDURE — 1159F MED LIST DOCD IN RCRD: CPT | Performed by: NURSE PRACTITIONER

## 2024-12-09 PROCEDURE — 3080F DIAST BP >= 90 MM HG: CPT | Performed by: NURSE PRACTITIONER

## 2024-12-09 PROCEDURE — 1125F AMNT PAIN NOTED PAIN PRSNT: CPT | Performed by: NURSE PRACTITIONER

## 2024-12-09 RX ORDER — AZITHROMYCIN 500 MG/1
500 TABLET, FILM COATED ORAL DAILY
Qty: 5 TABLET | Refills: 0 | Status: SHIPPED | OUTPATIENT
Start: 2024-12-09

## 2024-12-09 RX ORDER — ESCITALOPRAM OXALATE 20 MG/1
20 TABLET ORAL DAILY
Qty: 90 TABLET | Refills: 3 | Status: SHIPPED | OUTPATIENT
Start: 2024-12-09

## 2024-12-09 RX ORDER — PREDNISONE 20 MG/1
20 TABLET ORAL DAILY
Qty: 5 TABLET | Refills: 0 | Status: SHIPPED | OUTPATIENT
Start: 2024-12-09

## 2024-12-09 NOTE — PROGRESS NOTES
"Subjective   Bethany Weaver is a 78 y.o. female.   She is accompanied by her daughter who is POA due to dementia.   History of Present Illness    Since the last visit, she has overall felt well until recent respiratory infection that started a week ago.  Denies shortness of breath or wheezing. Denies fever.  Daughter states cough is getting worse. She is currently living with her daughter and has caregivers coming in during the day. She has  anxiety which her daughter feels has been worsening with progression of dementia. Patient is UTD with Dr. Muir and is still taking Namenda and Aricept . Daughter does feel that adding lower dose of Namenda back has helped with sleep and anxiety. she has been compliant with current medications have reviewed them.  The patient denies medication side effects.  Will refill medications. /92   Pulse 79   Ht 172.7 cm (68\")   Wt 79.8 kg (176 lb)   SpO2 97%   BMI 26.76 kg/m² .        Results for orders placed or performed in visit on 08/07/24   Folate    Collection Time: 08/07/24  9:22 AM    Specimen: Blood   Result Value Ref Range    Folate 19.99 4.78 - 24.20 ng/mL   Vitamin B12    Collection Time: 08/07/24  9:22 AM    Specimen: Blood   Result Value Ref Range    Vitamin B-12 379 211 - 946 pg/mL         The following portions of the patient's history were reviewed and updated as appropriate: allergies, current medications, past family history, past medical history, past social history, past surgical history, and problem list.    Review of Systems   Constitutional:  Negative for chills and fever.   HENT:  Positive for rhinorrhea. Negative for ear pain and sore throat.    Respiratory:  Positive for cough. Negative for shortness of breath and wheezing.    Cardiovascular:  Negative for chest pain.   Musculoskeletal:  Negative for myalgias.   Skin:  Negative for rash.   Neurological:  Negative for headaches.       Objective   Physical Exam  Vitals and nursing note reviewed. "   Constitutional:       Appearance: Normal appearance. She is well-developed.   HENT:      Right Ear: Tympanic membrane, ear canal and external ear normal.      Left Ear: Tympanic membrane, ear canal and external ear normal.      Nose: No mucosal edema.      Mouth/Throat:      Lips: Pink.      Mouth: Mucous membranes are moist.      Pharynx: Posterior oropharyngeal erythema present. No pharyngeal swelling.   Cardiovascular:      Rate and Rhythm: Normal rate and regular rhythm.   Pulmonary:      Effort: Pulmonary effort is normal.      Breath sounds: Examination of the left-lower field reveals rales. Rales present.      Comments: Friction rub heard in LLL   Neurological:      Mental Status: She is alert. Mental status is at baseline.   Psychiatric:         Mood and Affect: Mood normal.         Behavior: Behavior normal.         Assessment & Plan   Diagnoses and all orders for this visit:    1. Anxiety and depression (Primary)  -     escitalopram (LEXAPRO) 20 MG tablet; Take 1 tablet by mouth Daily.  Dispense: 90 tablet; Refill: 3    2. Delirium with dementia    3. Essential (primary) hypertension  -     Vitamin D 25 Hydroxy  -     TSH  -     CBC and Differential  -     Lipid panel  -     Comprehensive metabolic panel    4. Mixed hyperlipidemia  -     Vitamin D 25 Hydroxy  -     TSH  -     CBC and Differential  -     Lipid panel  -     Comprehensive metabolic panel    5. Vitamin D deficiency  -     Vitamin D 25 Hydroxy  -     TSH  -     CBC and Differential  -     Lipid panel  -     Comprehensive metabolic panel    6. Pneumonia of left lower lobe due to infectious organism  -     azithromycin (Zithromax) 500 MG tablet; Take 1 tablet by mouth Daily.  Dispense: 5 tablet; Refill: 0  -     predniSONE (DELTASONE) 20 MG tablet; Take 1 tablet by mouth Daily.  Dispense: 5 tablet; Refill: 0

## 2024-12-09 NOTE — TELEPHONE ENCOUNTER
Spoke to PT daughter Mirella, she stated she does not know her mother's username or password for [employer] to utilize telehealth visit. Advised we will see them for her scheduled appointment today.

## 2025-01-03 ENCOUNTER — OFFICE VISIT (OUTPATIENT)
Dept: FAMILY MEDICINE CLINIC | Facility: CLINIC | Age: 80
End: 2025-01-03
Payer: MEDICARE

## 2025-01-03 VITALS
HEIGHT: 68 IN | DIASTOLIC BLOOD PRESSURE: 82 MMHG | HEART RATE: 74 BPM | WEIGHT: 175 LBS | SYSTOLIC BLOOD PRESSURE: 126 MMHG | BODY MASS INDEX: 26.52 KG/M2 | OXYGEN SATURATION: 97 %

## 2025-01-03 DIAGNOSIS — J40 BRONCHITIS: Primary | ICD-10-CM

## 2025-01-03 PROCEDURE — 99213 OFFICE O/P EST LOW 20 MIN: CPT | Performed by: NURSE PRACTITIONER

## 2025-01-03 PROCEDURE — 3079F DIAST BP 80-89 MM HG: CPT | Performed by: NURSE PRACTITIONER

## 2025-01-03 PROCEDURE — 1159F MED LIST DOCD IN RCRD: CPT | Performed by: NURSE PRACTITIONER

## 2025-01-03 PROCEDURE — 1125F AMNT PAIN NOTED PAIN PRSNT: CPT | Performed by: NURSE PRACTITIONER

## 2025-01-03 PROCEDURE — 3074F SYST BP LT 130 MM HG: CPT | Performed by: NURSE PRACTITIONER

## 2025-01-03 PROCEDURE — G2211 COMPLEX E/M VISIT ADD ON: HCPCS | Performed by: NURSE PRACTITIONER

## 2025-01-03 PROCEDURE — 1160F RVW MEDS BY RX/DR IN RCRD: CPT | Performed by: NURSE PRACTITIONER

## 2025-01-03 RX ORDER — ALBUTEROL SULFATE 90 UG/1
INHALANT RESPIRATORY (INHALATION)
COMMUNITY
Start: 2024-12-28

## 2025-01-03 RX ORDER — ALBUTEROL SULFATE 1.25 MG/3ML
1 SOLUTION RESPIRATORY (INHALATION) EVERY 6 HOURS PRN
Qty: 360 ML | Refills: 2 | Status: SHIPPED | OUTPATIENT
Start: 2025-01-03

## 2025-01-03 RX ORDER — PREDNISONE 10 MG/1
TABLET ORAL
COMMUNITY
Start: 2024-12-28

## 2025-01-03 RX ORDER — DOXYCYCLINE 100 MG/1
CAPSULE ORAL
COMMUNITY
Start: 2024-12-28

## 2025-01-03 NOTE — PROGRESS NOTES
Subjective   Bethany Weaver is a 79 y.o. female.   Daughter who is POA is present for visit due to patient's dementia.  History of Present Illness   Answers submitted by the patient for this visit:  Primary Reason for Visit (Submitted on 1/3/2025)  What is the primary reason for your visit?: Cough  Patient was seen 3 weeks ago for symptoms and prescribed zpak and prednisone which she finished. She felt like symptoms improved some but never resolved.  Symptoms worsened again at the end of last week and she was seen at the [facility] on Friday.  She was given doxycycline, another round of prednisone and an albuterol inhaler.  She has 3 days left of the doxycycline.  Patient and daughter both state that her symptoms have significantly improved.  She has not used the albuterol inhaler much as her daughter is not certain that she is inhaling the medication fully.  The following portions of the patient's history were reviewed and updated as appropriate: allergies, current medications, past family history, past medical history, past social history, past surgical history, and problem list.    Review of Systems   Constitutional:  Negative for chills and fever.   HENT:  Negative for ear pain, postnasal drip, rhinorrhea and sore throat.    Respiratory:  Positive for cough. Negative for shortness of breath and wheezing.    Cardiovascular:  Negative for chest pain.   Musculoskeletal:  Negative for myalgias.   Skin:  Negative for rash.   Neurological:  Negative for headaches.       Objective   Physical Exam  Vitals and nursing note reviewed.   Constitutional:       Appearance: Normal appearance. She is well-developed.   Cardiovascular:      Rate and Rhythm: Normal rate and regular rhythm.   Pulmonary:      Effort: Pulmonary effort is normal.      Breath sounds: Wheezing present. No decreased breath sounds, rhonchi or rales.   Neurological:      Mental Status: She is alert. Mental status is at baseline.   Psychiatric:          Mood and Affect: Mood normal.         Behavior: Behavior normal.         Assessment & Plan   Diagnoses and all orders for this visit:    1. Bronchitis (Primary)  -     albuterol (ACCUNEB) 1.25 MG/3ML nebulizer solution; Take 3 mL by nebulization Every 6 (Six) Hours As Needed for Shortness of Air.  Dispense: 360 mL; Refill: 2        Gave her nebulizer through the office and daughter signed forms.  Sent albuterol nebulizer solution to the pharmacy.  She will finish the doxycycline.  She has to follow-up if symptoms recur.

## 2025-01-15 ENCOUNTER — LAB (OUTPATIENT)
Dept: LAB | Facility: HOSPITAL | Age: 80
End: 2025-01-15
Payer: MEDICARE

## 2025-01-15 ENCOUNTER — OFFICE VISIT (OUTPATIENT)
Dept: NEUROLOGY | Facility: CLINIC | Age: 80
End: 2025-01-15
Payer: MEDICARE

## 2025-01-15 VITALS
BODY MASS INDEX: 26.07 KG/M2 | WEIGHT: 172 LBS | OXYGEN SATURATION: 98 % | HEART RATE: 79 BPM | HEIGHT: 68 IN | DIASTOLIC BLOOD PRESSURE: 74 MMHG | SYSTOLIC BLOOD PRESSURE: 126 MMHG

## 2025-01-15 DIAGNOSIS — F03.C0 SEVERE DEMENTIA, UNSPECIFIED DEMENTIA TYPE, UNSPECIFIED WHETHER BEHAVIORAL, PSYCHOTIC, OR MOOD DISTURBANCE OR ANXIETY: Primary | ICD-10-CM

## 2025-01-15 LAB — VIT B12 BLD-MCNC: 1065 PG/ML (ref 211–946)

## 2025-01-15 PROCEDURE — 82607 VITAMIN B-12: CPT | Performed by: STUDENT IN AN ORGANIZED HEALTH CARE EDUCATION/TRAINING PROGRAM

## 2025-01-15 PROCEDURE — 36415 COLL VENOUS BLD VENIPUNCTURE: CPT | Performed by: STUDENT IN AN ORGANIZED HEALTH CARE EDUCATION/TRAINING PROGRAM

## 2025-01-15 RX ORDER — MEMANTINE HYDROCHLORIDE 5 MG/1
TABLET ORAL
Qty: 270 TABLET | Refills: 1 | Status: SHIPPED | OUTPATIENT
Start: 2025-01-15

## 2025-01-15 NOTE — PROGRESS NOTES
Chief Complaint   Patient presents with    Memory Loss       Patient ID: Bethany Weaver is a 79 y.o. female.    HPI:    The following portions of the patient's history were reviewed and updated as appropriate: allergies, current medications, past family history, past medical history, past social history, past surgical history and problem list.    Interval history:    Review of Systems   Neurological:  Positive for dizziness. Negative for tremors, seizures, syncope, facial asymmetry, speech difficulty, weakness, light-headedness, numbness and headaches.   Psychiatric/Behavioral:  Positive for confusion and decreased concentration. Negative for sleep disturbance.       Ms. Weaver is a 79-year-old female who presents to neurology clinic for follow-up of memory loss previously seen by Dr. Catherine Easton and Coretta.  She presents today with her daughter and power of .  Her daughter has moved in in February 2024 and continues to have caregiver services during the day.  The patient was off of Aricept for 1 to 2 months due to prescription issues but resumed this at her next appointment. She is sleeping better and not waking up in the middle of the night.   She has trouble with orientation and short-term memory and attention.  She in the past had severe headaches when she was at the full dose of 10 mg twice a day of memantine.   She can dress the wrong way like wearing sweaters during the summer and that has increased. Needs more help with bathroom.  Not wiping self and daughter helps her shower. She has had some apraxia spreading peanut butter on a plate instead of on a piece of bread. Slow when walking and can be unsteady when walking and dizzy when getting up. Decreased appetite. Lost 7 lbs since August 2024. Their plan is to keep her as independently as possible at home for the time being.  They are aware of senior home transitions.  She is taking B12 1000 mcg a day. Her mobility is limited per the patient's  family. She has difficulty memory wise transferring and has trouble navigating safe walking paths.    Vitals:    01/15/25 1022   BP: 126/74   Pulse: 79   SpO2: 98%       Neurological Exam  Mental Status    MMSE 6/30.      Physical Exam    Procedures    Assessment/Plan:    The patient has severe dementia. Based on testing and clinical history. She had some benefit from memantine in the past without side effects at a lower dosage so we will keep her at 5 mg twice a day and also resume donepezil 10 mg daily for now. We discussed the new Alzheimer's medicine she is not a candidate for given her current state of memory issues. Home hospice referral will be attempted but she is DNR code per patient's family report today and given her mobility limitations due to dementia that may qualify her for home hospice. If not approved, we will discuss further options at  next visit.  Repeat B12 level today.  Increase memantine to 5mg AM and 10mg PM.     Diagnoses and all orders for this visit:    1. Severe dementia, unspecified dementia type, unspecified whether behavioral, psychotic, or mood disturbance or anxiety (Primary)  -     Vitamin B12  -     Ambulatory Referral to Home Hospice    Other orders  -     memantine (NAMENDA) 5 MG tablet; Take 5mg in the morning and 10mg at night  Dispense: 270 tablet; Refill: 1    I spent 40 minutes caring for this patient on this date of service. This time includes time spent by me in the following activities as necessary: preparing for the visit, reviewing tests, medical records and previous visits, obtaining and/or reviewing a separately obtained history, performing a medically appropriate exam and/or evaluation, counseling and educating the patient, and/or communicating with other healthcare professionals, documenting information in the medical record, independently interpreting results and communicating that information with the patient, and developing a medically appropriate treatment plan  with consideration of other conditions, medications, and treatments.         Marck Muri MD

## 2025-01-31 RX ORDER — MEMANTINE HYDROCHLORIDE 5 MG/1
TABLET ORAL
Qty: 180 TABLET | OUTPATIENT
Start: 2025-01-31

## 2025-03-07 ENCOUNTER — TELEPHONE (OUTPATIENT)
Dept: NEUROLOGY | Facility: CLINIC | Age: 80
End: 2025-03-07
Payer: MEDICARE

## 2025-03-07 NOTE — TELEPHONE ENCOUNTER
Patient's daughter Mirella dropped off [facility] form 10-10CG on 3/7/2025 advised her provider would review

## 2025-03-13 ENCOUNTER — TELEPHONE (OUTPATIENT)
Dept: NEUROLOGY | Facility: CLINIC | Age: 80
End: 2025-03-13
Payer: MEDICARE

## 2025-03-13 NOTE — TELEPHONE ENCOUNTER
Eris filled out by Dr. Muir- called pts dtr to notify her it is ready for  at the [place] location. She lan.

## 2025-03-26 ENCOUNTER — TELEPHONE (OUTPATIENT)
Dept: NEUROLOGY | Facility: CLINIC | Age: 80
End: 2025-03-26
Payer: MEDICARE

## 2025-03-26 NOTE — TELEPHONE ENCOUNTER
Provider: DR FRAGA    Caller: Mirella Weaver    Relationship to Patient: Emergency Contact    Pharmacy: [facility] [phone]    Phone Number: [phone]    Reason for Call: STATES [facility] IS NEEDING PRIOR AUTH. PATIENT IS OUT OF SCRIPT. PLEASE ADVISE, THANK YOU.

## 2025-03-27 RX ORDER — MEMANTINE HYDROCHLORIDE 5 MG/1
TABLET ORAL
Qty: 270 TABLET | Refills: 1 | Status: SHIPPED | OUTPATIENT
Start: 2025-03-27

## 2025-04-29 RX ORDER — MEMANTINE HYDROCHLORIDE 10 MG/1
10 TABLET ORAL NIGHTLY
Qty: 90 TABLET | Refills: 3 | Status: SHIPPED | OUTPATIENT
Start: 2025-04-29 | End: 2026-04-29

## 2025-04-29 RX ORDER — MEMANTINE HYDROCHLORIDE 5 MG/1
TABLET ORAL
Qty: 90 TABLET | Refills: 3 | Status: SHIPPED | OUTPATIENT
Start: 2025-04-29

## 2025-05-01 RX ORDER — DONEPEZIL HYDROCHLORIDE 10 MG/1
10 TABLET, FILM COATED ORAL DAILY
Qty: 90 TABLET | Refills: 2 | Status: SHIPPED | OUTPATIENT
Start: 2025-05-01

## 2025-05-05 ENCOUNTER — TELEPHONE (OUTPATIENT)
Dept: NEUROLOGY | Facility: CLINIC | Age: 80
End: 2025-05-05
Payer: MEDICARE

## 2025-05-05 DIAGNOSIS — R46.89 INAPPROPRIATE SOCIAL BEHAVIOR: Primary | ICD-10-CM

## 2025-05-05 NOTE — TELEPHONE ENCOUNTER
PATIENTS DAUGHTER, PRECIOUS CALLING TO ADVISE PROVIDER -    SHE STATES PATIENT IS ATTENDING ADULT  3X WEEK.  WHEN PT IS THERE, THEY ARE NOTICING A BEHAVIOR CHANGE - SHE IS TRYING TO KISS STAFF, TOUCH MALE STAFF, ETC.    PRECIOUS WANTS TO KNOW IF THERE IS SOMETHING THAT THEY CAN DO OR A MEDICATION THAT MIGHT HELP WITH THIS BEHAVIOR.    PLEASE ADVISE PRECIOUS.

## 2025-05-05 NOTE — TELEPHONE ENCOUNTER
Called number for Mirella and it was [facility], no way to speak with her.  HUB CAN READ MESSAGE

## 2025-07-16 ENCOUNTER — OFFICE VISIT (OUTPATIENT)
Dept: NEUROLOGY | Facility: CLINIC | Age: 80
End: 2025-07-16
Payer: MEDICARE

## 2025-07-16 VITALS
BODY MASS INDEX: 28.49 KG/M2 | DIASTOLIC BLOOD PRESSURE: 80 MMHG | HEART RATE: 76 BPM | HEIGHT: 68 IN | WEIGHT: 188 LBS | OXYGEN SATURATION: 97 % | SYSTOLIC BLOOD PRESSURE: 138 MMHG

## 2025-07-16 DIAGNOSIS — F03.C18 SEVERE DEMENTIA WITH OTHER BEHAVIORAL DISTURBANCE, UNSPECIFIED DEMENTIA TYPE: Primary | ICD-10-CM

## 2025-07-16 PROCEDURE — 3079F DIAST BP 80-89 MM HG: CPT | Performed by: STUDENT IN AN ORGANIZED HEALTH CARE EDUCATION/TRAINING PROGRAM

## 2025-07-16 PROCEDURE — 3075F SYST BP GE 130 - 139MM HG: CPT | Performed by: STUDENT IN AN ORGANIZED HEALTH CARE EDUCATION/TRAINING PROGRAM

## 2025-07-16 PROCEDURE — 1160F RVW MEDS BY RX/DR IN RCRD: CPT | Performed by: STUDENT IN AN ORGANIZED HEALTH CARE EDUCATION/TRAINING PROGRAM

## 2025-07-16 PROCEDURE — 1159F MED LIST DOCD IN RCRD: CPT | Performed by: STUDENT IN AN ORGANIZED HEALTH CARE EDUCATION/TRAINING PROGRAM

## 2025-07-16 PROCEDURE — 99213 OFFICE O/P EST LOW 20 MIN: CPT | Performed by: STUDENT IN AN ORGANIZED HEALTH CARE EDUCATION/TRAINING PROGRAM

## 2025-07-16 NOTE — PROGRESS NOTES
Chief Complaint   Patient presents with    Dementia       Patient ID: Bethany Weaver is a 79 y.o. female.    HPI:    The following portions of the patient's history were reviewed and updated as appropriate: allergies, current medications, past family history, past medical history, past social history, past surgical history and problem list.    Interval history:    Review of Systems   Neurological:  Positive for dizziness. Negative for tremors, seizures, syncope, facial asymmetry, speech difficulty, weakness, light-headedness, numbness and headaches.   Psychiatric/Behavioral:  Positive for confusion and decreased concentration. Negative for sleep disturbance. The patient is not nervous/anxious.         Ms. Weaver is a 79-year-old female who presents to neurology clinic for follow-up of memory loss previously seen by Dr. Catherine Easton and Coretta.  She presents today with her daughter and power of .  Her daughter has moved in in February 2024 and continues to have caregiver services during the day.  Patient on donepezil 10mg daily and memantine 5mg/10mg. She has trouble with orientation and short-term memory and attention.  She in the past had severe headaches when she was at the full dose of 10 mg twice a day of memantine. She can dress the wrong way like wearing sweaters during the summer and that can happen in middle of night, daughter helps with dressing. Needs  help with bathroom, daughter helps, and close to wearing diaper. Not wiping herself and daughter helps her shower. She has had some apraxia spreading peanut butter on a plate instead of on a piece of bread. Daughter notes inappropriate behavior with male staff. Slow when walking and can be unsteady when walking and dizzy when getting up. Daughter prepares meals. Does things out of order, shoes can be put on wrong feet and having trouble walking. Not recognizing food in front of her or that her reflection in a mirror is herself. Not recognizing  home as home. Had an event where she wandered away from her sister's home. No falls but stumbles noted.  Gained 16 lbs since last appointment. Their plan is to keep her as independently as possible at home for the time being.  They are aware of senior home transitions.  She is taking B12 1000 mcg a day. Her mobility is limited per the patient's family. She has difficulty memory wise transferring and has trouble navigating safe walking paths.  Had a note sent prior to appointment  PATIENT IS ATTENDING ADULT  3X WEEK.  WHEN PT IS THERE, THEY ARE NOTICING A BEHAVIOR CHANGE - SHE IS TRYING TO KISS STAFF, TOUCH MALE STAFF, ETC.   Worn out after .  Saw [facility] psychiatry and switched from escitalopram to sertraline 50mg daily.   Daughter notes that some nonpharmacological changes could be made at  to limit inappropriate behavior.        Vitals:    07/16/25 1157   BP: 138/80   Pulse: 76   SpO2: 97%       Neurological Exam  Mental Status    MMSE 6/30 again, stable from prior score of 6/30.      Physical Exam    Procedures    Assessment/Plan:    Assessment & Plan  1. Dementia.  Her memory test score remains low at 6 out of 30, indicating severe dementia. She has gained weight since the last visit, now weighing 188 pounds. The possibility of hospice care was also considered if her condition deteriorates further and she cannot walk. The importance of physical activity in slowing memory loss and preventing dementia was emphasized. She was advised to maintain a safe distance from others and to select female caregivers over male ones when possible. Her current medication regimen will be continued as she is still able to walk. If she becomes wheelchair-bound or bedbound, discontinuation of these medications will be considered.  I spent 20 minutes caring for this patient on this date of service. This time includes time spent by me in the following activities as necessary: preparing for the visit, reviewing  tests, medical records and previous visits, obtaining and/or reviewing a separately obtained history, performing a medically appropriate exam and/or evaluation, counseling and educating the patient, and/or communicating with other healthcare professionals, documenting information in the medical record, independently interpreting results and communicating that information with the patient, and developing a medically appropriate treatment plan with consideration of other conditions, medications, and treatments.    Follow-up  A follow-up visit is scheduled for 6 months from now.     Patient or patient representative verbalized consent for the use of Ambient Listening during the visit with  Marck Muir MD for chart documentation. 7/22/2025  16:50 EDT     Diagnoses and all orders for this visit:    1. Severe dementia with other behavioral disturbance, unspecified dementia type (Primary)           Marck Muir MD

## (undated) DEVICE — WRAP KNEE COLD THERAPY

## (undated) DEVICE — ZIP 24 SURGICAL SKIN CLOSURE DEVICE, PSA: Brand: ZIP 24 SURGICAL SKIN CLOSURE DEVICE

## (undated) DEVICE — CEMENT MIXING SYSTEM WITH FEMORAL BREAKWAY NOZZLE: Brand: REVOLUTION

## (undated) DEVICE — ZIP 16 SURGICAL SKIN CLOSURE DEVICE, PSA: Brand: ZIP 16 SURGICAL SKIN CLOSURE DEVICE

## (undated) DEVICE — DUAL CUT SAGITTAL BLADE

## (undated) DEVICE — SYR LUERLOK 30CC

## (undated) DEVICE — PENCL EVAC ULTRAVAC SMOKE W/BLD

## (undated) DEVICE — NEEDLE, QUINCKE, 20GX3.5": Brand: MEDLINE

## (undated) DEVICE — ZIPPERED TOGA, 2X LARGE: Brand: FLYTE

## (undated) DEVICE — KT SURG TURNOVER 050

## (undated) DEVICE — SOL ISO/ALC RUB 70PCT 4OZ

## (undated) DEVICE — UNDERGLV SURG BIOGEL INDICAT PI SZ8.5 BLU

## (undated) DEVICE — SUT VIC 0 CT1 36IN J946H

## (undated) DEVICE — SOL IRRIG NACL 1000ML

## (undated) DEVICE — HANDPIECE SET WITH COAXIAL MULTI-ORIFICE TIP AND SUCTION TUBE: Brand: INTERPULSE

## (undated) DEVICE — APPL CHLORAPREP HI/LITE 26ML ORNG

## (undated) DEVICE — UNDRGLV SURG BIOGEL PUNCTUREINDICATION SZ7 PF STRL

## (undated) DEVICE — GOWN,REINFRCE,POLY,SIRUS,BREATH SLV,XXLG: Brand: MEDLINE

## (undated) DEVICE — 450 ML BOTTLE OF 0.05% CHLORHEXIDINE GLUCONATE IN 99.95% STERILE WATER FOR IRRIGATION, USP AND APPLICATOR.: Brand: IRRISEPT ANTIMICROBIAL WOUND LAVAGE

## (undated) DEVICE — GLV SURG SENSICARE PI ORTHO PF SZ7 LF STRL

## (undated) DEVICE — STERILE PATIENT PROTECTIVE PAD FOR IMP® KNEE POSITIONERS & COHESIVE WRAP (10 / CASE): Brand: DE MAYO KNEE POSITIONER®

## (undated) DEVICE — SOL IRR NACL 0.9PCT 3000ML

## (undated) DEVICE — PK TOTL KN 50

## (undated) DEVICE — GLV SURG SENSICARE PI ORTHO SZ8.5 LF STRL

## (undated) DEVICE — IRRIGATOR BULB ASEPTO 60CC STRL